# Patient Record
Sex: MALE | Race: ASIAN | NOT HISPANIC OR LATINO | Employment: OTHER | URBAN - METROPOLITAN AREA
[De-identification: names, ages, dates, MRNs, and addresses within clinical notes are randomized per-mention and may not be internally consistent; named-entity substitution may affect disease eponyms.]

---

## 2017-01-24 ENCOUNTER — LAB VISIT (OUTPATIENT)
Dept: LAB | Facility: HOSPITAL | Age: 72
End: 2017-01-24
Payer: MEDICARE

## 2017-01-24 DIAGNOSIS — R97.20 ELEVATED PSA: ICD-10-CM

## 2017-01-24 LAB — COMPLEXED PSA SERPL-MCNC: 2.2 NG/ML

## 2017-01-24 PROCEDURE — 84153 ASSAY OF PSA TOTAL: CPT

## 2017-01-24 PROCEDURE — 36415 COLL VENOUS BLD VENIPUNCTURE: CPT

## 2017-01-31 ENCOUNTER — OFFICE VISIT (OUTPATIENT)
Dept: UROLOGY | Facility: CLINIC | Age: 72
End: 2017-01-31
Payer: MEDICARE

## 2017-01-31 VITALS
DIASTOLIC BLOOD PRESSURE: 68 MMHG | WEIGHT: 169.06 LBS | HEART RATE: 59 BPM | BODY MASS INDEX: 26.53 KG/M2 | SYSTOLIC BLOOD PRESSURE: 117 MMHG | HEIGHT: 67 IN

## 2017-01-31 DIAGNOSIS — N52.9 ED (ERECTILE DYSFUNCTION) OF ORGANIC ORIGIN: ICD-10-CM

## 2017-01-31 DIAGNOSIS — N40.1 BPH (BENIGN PROSTATIC HYPERTROPHY) WITH URINARY OBSTRUCTION: Primary | ICD-10-CM

## 2017-01-31 DIAGNOSIS — N13.8 BPH (BENIGN PROSTATIC HYPERTROPHY) WITH URINARY OBSTRUCTION: Primary | ICD-10-CM

## 2017-01-31 PROCEDURE — 99214 OFFICE O/P EST MOD 30 MIN: CPT | Mod: S$PBB,,, | Performed by: UROLOGY

## 2017-01-31 PROCEDURE — 99999 PR PBB SHADOW E&M-EST. PATIENT-LVL III: CPT | Mod: PBBFAC,,, | Performed by: UROLOGY

## 2017-01-31 PROCEDURE — 99213 OFFICE O/P EST LOW 20 MIN: CPT | Mod: PBBFAC | Performed by: UROLOGY

## 2017-01-31 NOTE — PROGRESS NOTES
CC: ED    Pee Alba is a 71 y.o. man who is here for the evaluation of erectile dysfunction (follow up appiontment pt voice no issue at this vist he feel he doing well )  a [place] pt seen by me before for prostatitis before with elevated PSA.   He is doing well.   Stopped rapaflo because he felt his urination is back to normal.     c/o ED  Tried pills and found that he did not respond to the pills.  Went to a commercial ED clinic and tried PEP injection.  Disappointed to their service and its cost.    Positive smoking more than 50 years pack.    Denies flank pain, dysuira, hematuria .      Past Medical History   Diagnosis Date    Aortic aneurysm     Arrhythmia     Hypertension      Past Surgical History   Procedure Laterality Date    Abdominal surgery       Social History   Substance Use Topics    Smoking status: Current Every Day Smoker     Packs/day: 0.50     Types: Cigarettes    Smokeless tobacco: Never Used    Alcohol use No     History reviewed. No pertinent family history.  Allergy:  Review of patient's allergies indicates:   Allergen Reactions    Iodinated contrast media - iv dye Anaphylaxis     Outpatient Encounter Prescriptions as of 1/31/2017   Medication Sig Dispense Refill    amlodipine-valsartan (EXFORGE)  mg per tablet Take 1 tablet by mouth once daily.      carvedilol (COREG) 12.5 MG tablet Take 12.5 mg by mouth 2 (two) times daily with meals.      omeprazole (PRILOSEC) 20 MG capsule Take 20 mg by mouth once daily.      torsemide (DEMADEX) 5 MG Tab Take 20 mg by mouth once daily.      [DISCONTINUED] papaverine 30 mg/mL injection Add: Phentolamine 10 mg  Add: PGE1 100 mcg    Sig:  Inject 15 units (0.15 mls) as directed 5 mL 11     Facility-Administered Encounter Medications as of 1/31/2017   Medication Dose Route Frequency Provider Last Rate Last Dose    [DISCONTINUED] ciprofloxacin tablet 500 mg  500 mg Oral 1 time in Clinic/HOD James Aponte MD         Review of Systems   General  ROS: GENERAL:  No weight gain or loss  SKIN:  No rashes or lacerations  HEAD:  No headaches  EYES:  No exophthalmos, jaundice or blindness  EARS:  No dizziness, tinnitus or hearing loss  NOSE:  No changes in smell  MOUTH & THROAT:  No dyskinetic movements or obvious goiter  CHEST:  No shortness of breath, hyperventilation or cough  CARDIOVASCULAR:  No tachycardia or chest pain  ABDOMEN:  No nausea, vomiting, pain, constipation or diarrhea  URINARY:  No frequency, dysuria or sexual dysfunction  ENDOCRINE:  No polydipsia, polyuria  MUSCULOSKELETAL:  No pain or stiffness of the joints  NEUROLOGIC:  No weakness, sensory changes, seizures, confusion, memory loss, tremor or other abnormal movements  Physical Exam     Vitals:    01/31/17 0940   BP: 117/68   Pulse: (!) 59     General Appearance:  Alert, cooperative, no distress, appears stated age   Head:  Normocephalic, without obvious abnormality, atraumatic   Eyes:  PERRL, conjunctiva/corneas clear, EOM's intact, fundi benign, both eyes   Ears:  Normal TM's and external ear canals, both ears   Nose: Nares normal, septum midline, mucosa normal, no drainage or sinus tenderness   Throat: Lips, mucosa, and tongue normal; teeth and gums normal   Neck: Supple, symmetrical, trachea midline, no adenopathy, thyroid: not enlarged, symmetric, no tenderness/mass/nodules, no carotid bruit or JVD   Back:   Symmetric, no curvature, ROM normal, no CVA tenderness   Lungs:   Clear to auscultation bilaterally, respirations unlabored   Chest Wall:  No tenderness or deformity   Heart:  Regular rate and rhythm, S1, S2 normal, no murmur, rub or gallop   Abdomen:   Soft, non-tender, bowel sounds active all four quadrants,  no masses, no organomegaly of liver and spleen  No hernia noted   Genitalia:  Scrotum: no rash or lesion  Normal symmetric epididymis without masses  Normal vas palpated  Normal size, symmetric testicles with no masses   Normal urethral meatus with no discharge  Normal  circumcised penis with no lesion   Rectal:  Normal perineum and anus upon inspection.  Normal tone, no masses or tenderness;   Extremities: Extremities normal, atraumatic, no cyanosis or edema   Pulses: 2+ and symmetric   Skin: Skin color, texture, turgor normal, no rashes or lesions   Lymph nodes: Cervical, supraclavicular, and axillary nodes normal   Neurologic: Normal     Prostate 30 grams smooth with no nodule or tenderness.    LABS:  Lab Results   Component Value Date    PSADIAG 2.2 01/24/2017    PSADIAG 3.3 01/12/2016    PSADIAG 3.1 11/18/2014    PSADIAG 29.9 (H) 03/13/2014     Results for orders placed or performed in visit on 01/24/17   Prostate Specific Antigen, Diagnostic   Result Value Ref Range    PSA DIAGNOSTIC 2.2 0.00 - 4.00 ng/mL   Results for orders placed or performed in visit on 01/12/16   Prostate Specific Antigen, Diagnostic   Result Value Ref Range    PSA DIAGNOSTIC 3.3 0.00 - 4.00 ng/mL   Results for orders placed or performed in visit on 11/18/14   Prostate Specific Antigen, Diagnostic   Result Value Ref Range    PSA DIAGNOSTIC 3.1 0.00 - 4.00 ng/mL     Lab Results   Component Value Date    CREATININE 1.2 03/14/2014    CREATININE 1.2 03/13/2014    CREATININE 0.9 12/06/2012     No results found for this or any previous visit.  Urine Culture, Routine   Date Value Ref Range Status   03/13/2014 No significant growth  Final     Assessment and Plan:  Pee was seen today for erectile dysfunction.    Diagnoses and all orders for this visit:    BPH (benign prostatic hypertrophy) with urinary obstruction    ED (erectile dysfunction) of organic origin  he failed to respond to pills and PEP injection.  Suspect severe ED due to arterial insufficiency.  Recommend to stop smoking.  IPP discussed in detail.  Nature and risks of operation explained.  A brochure given.  He will review it and will let me know if he decided to pursue IPP.  I spent 25 minutes with the patient of which more than half was spent in  direct consultation with the patient in regards to our treatment and plan.      Follow-up:  Return in about 1 year (around 1/31/2018).

## 2017-01-31 NOTE — MR AVS SNAPSHOT
[facility] - Urology 4th Floor  [address]  [place] [address]  Phone: [phone]                  Pee Alba   2017 9:20 AM   Office Visit    Description:  Male : 1945   Provider:  James Aponte MD   Department:  [facility] - Urology 4th Floor           Reason for Visit     erectile dysfunction           Diagnoses this Visit        Comments    BPH (benign prostatic hypertrophy) with urinary obstruction    -  Primary     ED (erectile dysfunction) of organic origin                To Do List           Goals (5 Years of Data)     None      Follow-Up and Disposition     Return in about 1 year (around 2018).      [facility]     [facility] -  Assistance  Registered nurses in the [facility] provide clinical advisement, health education, appointment booking, and other advisory services.  Call for this free service at [phone].             Medications           Message regarding Medications     Verify the changes and/or additions to your medication regime listed below are the same as discussed with your clinician today.  If any of these changes or additions are incorrect, please notify your healthcare provider.        STOP taking these medications     papaverine 30 mg/mL injection Add: Phentolamine 10 mg  Add: PGE1 100 mcg    Sig:  Inject 15 units (0.15 mls) as directed    ciprofloxacin tablet 500 mg            Verify that the below list of medications is an accurate representation of the medications you are currently taking.  If none reported, the list may be blank. If incorrect, please contact your healthcare provider. Carry this list with you in case of emergency.           Current Medications     amlodipine-valsartan (EXFORGE)  mg per tablet Take 1 tablet by mouth once daily.    carvedilol (COREG) 12.5 MG tablet Take 12.5 mg by mouth 2 (two) times daily with meals.    omeprazole (PRILOSEC) 20 MG capsule Take 20 mg by mouth once daily.  "   torsemide (DEMADEX) 5 MG Tab Take 20 mg by mouth once daily.           Clinical Reference Information           Vital Signs - Last Recorded  Most recent update: 1/31/2017  9:41 AM by Keyona Bello MA    BP Pulse Ht Wt BMI    117/68 (!) 59 5' 7" (1.702 m) 76.7 kg (169 lb 1.5 oz) 26.48 kg/m2      Blood Pressure          Most Recent Value    BP  117/68      Allergies as of 1/31/2017     Iodinated Contrast Media - Iv Dye      Immunizations Administered on Date of Encounter - 1/31/2017     None      Smoking Cessation     If you would like to quit smoking:   You may be eligible for free services if you are a [place] resident and started smoking cigarettes before September 1, 1988.  Call the [employer] ([employer]) toll free at [phone] or [phone].   Call 2-837-QUIT-NOW if you do not meet the above criteria.            "

## 2019-06-18 DIAGNOSIS — N52.9 ED (ERECTILE DYSFUNCTION) OF ORGANIC ORIGIN: ICD-10-CM

## 2019-06-18 DIAGNOSIS — N13.8 BPH WITH URINARY OBSTRUCTION: Primary | ICD-10-CM

## 2019-06-18 DIAGNOSIS — Z86.39 HISTORY OF HIGH CHOLESTEROL: ICD-10-CM

## 2019-06-18 DIAGNOSIS — N40.1 BPH WITH URINARY OBSTRUCTION: Primary | ICD-10-CM

## 2019-06-18 NOTE — PROGRESS NOTES
BPH with urinary obstruction  -     Prostate Specific Antigen, Diagnostic; Future; Expected date: 06/18/2019    ED (erectile dysfunction) of organic origin  -     CBC auto differential; Future; Expected date: 06/18/2019  -     Comprehensive metabolic panel; Future; Expected date: 06/18/2019  -     Testosterone; Future; Expected date: 06/18/2019    History of high cholesterol    please have him do the above tests as fasting AM blood tests prior to his visit on 7/16/19.  Thank you.

## 2019-06-25 ENCOUNTER — LAB VISIT (OUTPATIENT)
Dept: LAB | Facility: HOSPITAL | Age: 74
End: 2019-06-25
Attending: UROLOGY
Payer: MEDICARE

## 2019-06-25 DIAGNOSIS — N52.9 ED (ERECTILE DYSFUNCTION) OF ORGANIC ORIGIN: ICD-10-CM

## 2019-06-25 DIAGNOSIS — N40.1 BPH WITH URINARY OBSTRUCTION: ICD-10-CM

## 2019-06-25 DIAGNOSIS — N13.8 BPH WITH URINARY OBSTRUCTION: ICD-10-CM

## 2019-06-25 LAB
ALBUMIN SERPL BCP-MCNC: 4 G/DL (ref 3.5–5.2)
ALP SERPL-CCNC: 45 U/L (ref 55–135)
ALT SERPL W/O P-5'-P-CCNC: 27 U/L (ref 10–44)
ANION GAP SERPL CALC-SCNC: 8 MMOL/L (ref 8–16)
AST SERPL-CCNC: 18 U/L (ref 10–40)
BASOPHILS # BLD AUTO: 0.03 K/UL (ref 0–0.2)
BASOPHILS NFR BLD: 0.4 % (ref 0–1.9)
BILIRUB SERPL-MCNC: 0.8 MG/DL (ref 0.1–1)
BUN SERPL-MCNC: 21 MG/DL (ref 8–23)
CALCIUM SERPL-MCNC: 10.3 MG/DL (ref 8.7–10.5)
CHLORIDE SERPL-SCNC: 105 MMOL/L (ref 95–110)
CO2 SERPL-SCNC: 28 MMOL/L (ref 23–29)
COMPLEXED PSA SERPL-MCNC: 2.5 NG/ML (ref 0–4)
CREAT SERPL-MCNC: 0.9 MG/DL (ref 0.5–1.4)
DIFFERENTIAL METHOD: ABNORMAL
EOSINOPHIL # BLD AUTO: 0.3 K/UL (ref 0–0.5)
EOSINOPHIL NFR BLD: 3.5 % (ref 0–8)
ERYTHROCYTE [DISTWIDTH] IN BLOOD BY AUTOMATED COUNT: 13.6 % (ref 11.5–14.5)
EST. GFR  (AFRICAN AMERICAN): >60 ML/MIN/1.73 M^2
EST. GFR  (NON AFRICAN AMERICAN): >60 ML/MIN/1.73 M^2
GLUCOSE SERPL-MCNC: 107 MG/DL (ref 70–110)
HCT VFR BLD AUTO: 46.5 % (ref 40–54)
HGB BLD-MCNC: 15.2 G/DL (ref 14–18)
IMM GRANULOCYTES # BLD AUTO: 0.04 K/UL (ref 0–0.04)
IMM GRANULOCYTES NFR BLD AUTO: 0.6 % (ref 0–0.5)
LYMPHOCYTES # BLD AUTO: 1.8 K/UL (ref 1–4.8)
LYMPHOCYTES NFR BLD: 25 % (ref 18–48)
MCH RBC QN AUTO: 30.5 PG (ref 27–31)
MCHC RBC AUTO-ENTMCNC: 32.7 G/DL (ref 32–36)
MCV RBC AUTO: 93 FL (ref 82–98)
MONOCYTES # BLD AUTO: 0.4 K/UL (ref 0.3–1)
MONOCYTES NFR BLD: 6.2 % (ref 4–15)
NEUTROPHILS # BLD AUTO: 4.6 K/UL (ref 1.8–7.7)
NEUTROPHILS NFR BLD: 64.3 % (ref 38–73)
NRBC BLD-RTO: 0 /100 WBC
PLATELET # BLD AUTO: 190 K/UL (ref 150–350)
PMV BLD AUTO: 9.3 FL (ref 9.2–12.9)
POTASSIUM SERPL-SCNC: 4.2 MMOL/L (ref 3.5–5.1)
PROT SERPL-MCNC: 7.1 G/DL (ref 6–8.4)
RBC # BLD AUTO: 4.99 M/UL (ref 4.6–6.2)
SODIUM SERPL-SCNC: 141 MMOL/L (ref 136–145)
TESTOST SERPL-MCNC: 445 NG/DL (ref 304–1227)
WBC # BLD AUTO: 7.08 K/UL (ref 3.9–12.7)

## 2019-06-25 PROCEDURE — 80053 COMPREHEN METABOLIC PANEL: CPT

## 2019-06-25 PROCEDURE — 84153 ASSAY OF PSA TOTAL: CPT

## 2019-06-25 PROCEDURE — 85025 COMPLETE CBC W/AUTO DIFF WBC: CPT

## 2019-06-25 PROCEDURE — 84403 ASSAY OF TOTAL TESTOSTERONE: CPT

## 2019-06-25 PROCEDURE — 36415 COLL VENOUS BLD VENIPUNCTURE: CPT

## 2019-07-16 ENCOUNTER — OFFICE VISIT (OUTPATIENT)
Dept: UROLOGY | Facility: CLINIC | Age: 74
End: 2019-07-16
Payer: MEDICARE

## 2019-07-16 VITALS
HEART RATE: 60 BPM | BODY MASS INDEX: 26.9 KG/M2 | HEIGHT: 68 IN | DIASTOLIC BLOOD PRESSURE: 73 MMHG | SYSTOLIC BLOOD PRESSURE: 122 MMHG | WEIGHT: 177.5 LBS

## 2019-07-16 DIAGNOSIS — N13.8 BENIGN PROSTATIC HYPERPLASIA WITH URINARY OBSTRUCTION: Primary | ICD-10-CM

## 2019-07-16 DIAGNOSIS — N52.9 ED (ERECTILE DYSFUNCTION) OF ORGANIC ORIGIN: ICD-10-CM

## 2019-07-16 DIAGNOSIS — N40.1 BENIGN PROSTATIC HYPERPLASIA WITH URINARY OBSTRUCTION: Primary | ICD-10-CM

## 2019-07-16 PROCEDURE — 99999 PR PBB SHADOW E&M-EST. PATIENT-LVL III: ICD-10-PCS | Mod: PBBFAC,,, | Performed by: UROLOGY

## 2019-07-16 PROCEDURE — 99999 PR PBB SHADOW E&M-EST. PATIENT-LVL III: CPT | Mod: PBBFAC,,, | Performed by: UROLOGY

## 2019-07-16 PROCEDURE — 99215 PR OFFICE/OUTPT VISIT, EST, LEVL V, 40-54 MIN: ICD-10-PCS | Mod: S$PBB,,, | Performed by: UROLOGY

## 2019-07-16 PROCEDURE — 99213 OFFICE O/P EST LOW 20 MIN: CPT | Mod: PBBFAC | Performed by: UROLOGY

## 2019-07-16 PROCEDURE — 99215 OFFICE O/P EST HI 40 MIN: CPT | Mod: S$PBB,,, | Performed by: UROLOGY

## 2019-07-16 RX ORDER — TADALAFIL 5 MG/1
5 TABLET ORAL DAILY PRN
Qty: 30 TABLET | Refills: 11 | Status: SHIPPED | OUTPATIENT
Start: 2019-07-16 | End: 2020-10-27 | Stop reason: SDUPTHER

## 2019-07-16 RX ORDER — SILDENAFIL 100 MG/1
100 TABLET, FILM COATED ORAL DAILY PRN
Qty: 10 TABLET | Refills: 12 | Status: SHIPPED | OUTPATIENT
Start: 2019-07-16 | End: 2019-08-15

## 2019-07-16 RX ORDER — TAMSULOSIN HYDROCHLORIDE 0.4 MG/1
0.4 CAPSULE ORAL NIGHTLY
Qty: 30 CAPSULE | Refills: 11 | Status: SHIPPED | OUTPATIENT
Start: 2019-07-16 | End: 2020-10-27 | Stop reason: SDUPTHER

## 2019-07-16 NOTE — PROGRESS NOTES
CC: weak urine, nocturia 3 x, ED    Pee Alba is a 74 y.o. man who is here for the evaluation of ED and BPH with obstruciton  Hx of ED and Benign Prostatic Hypertrophy with prostatitis  a Chinese pt seen by me before for prostatitis before with elevated PSA.   He is doing well.   Stopped rapaflo because he felt his urination is back to normal.     c/o ED  Tried pills and found that he did not respond to the pills.  Went to a commercial ED clinic and tried PEP injection.  Disappointed to their service and its cost.  So he tried PEP injection with Karo Roque.  However, he reports that the outcome was as good as before.  So he no longer uses a pep injection.    Positive smoking    Past Medical History:   Diagnosis Date    Aortic aneurysm     Arrhythmia     Hypertension      Past Surgical History:   Procedure Laterality Date    ABDOMINAL SURGERY       Social History     Tobacco Use    Smoking status: Current Every Day Smoker     Packs/day: 0.50     Types: Cigarettes    Smokeless tobacco: Never Used   Substance Use Topics    Alcohol use: No    Drug use: Not on file     No family history on file.  Allergy:  Review of patient's allergies indicates:   Allergen Reactions    Iodinated contrast- oral and iv dye Anaphylaxis     Outpatient Encounter Medications as of 7/16/2019   Medication Sig Dispense Refill    amlodipine-valsartan (EXFORGE)  mg per tablet Take 1 tablet by mouth once daily.      carvedilol (COREG) 12.5 MG tablet Take 12.5 mg by mouth 2 (two) times daily with meals.      omeprazole (PRILOSEC) 20 MG capsule Take 20 mg by mouth once daily.      torsemide (DEMADEX) 5 MG Tab Take 20 mg by mouth once daily.      sildenafil (VIAGRA) 100 MG tablet Take 1 tablet (100 mg total) by mouth daily as needed. 10 tablet 12    tadalafil (CIALIS) 5 MG tablet Take 1 tablet (5 mg total) by mouth daily as needed for Erectile Dysfunction. 30 tablet 11    tamsulosin (FLOMAX) 0.4 mg Cap Take 1 capsule (0.4 mg  total) by mouth every evening. 30 capsule 11     No facility-administered encounter medications on file as of 7/16/2019.      Review of Systems   ROS  Physical Exam     Vitals:    07/16/19 1057   BP: 122/73   Pulse: 60     Physical Exam   Constitutional: He is oriented to person, place, and time. He appears well-developed and well-nourished. No distress.   HENT:   Head: Normocephalic and atraumatic.   Right Ear: External ear normal.   Left Ear: External ear normal.   Nose: Nose normal.   Mouth/Throat: Oropharynx is clear and moist.   Eyes: Conjunctivae are normal. Pupils are equal, round, and reactive to light.   Neck: Normal range of motion. Neck supple. No JVD present. No tracheal deviation present. No thyromegaly present.   Cardiovascular: Normal rate, regular rhythm, normal heart sounds and intact distal pulses.  Exam reveals no gallop and no friction rub.    No murmur heard.  Pulmonary/Chest: Effort normal and breath sounds normal. No respiratory distress. He has no wheezes. He exhibits no tenderness.   Abdominal: Soft. Bowel sounds are normal. He exhibits no distension and no mass. There is no tenderness. There is no rebound and no guarding.   Genitourinary: Rectum normal and penis normal. No penile tenderness.   Genitourinary Comments: Prostate 30 grams with negative nodule or negative tenderness     Musculoskeletal: Normal range of motion. He exhibits no edema, tenderness or deformity.   Lymphadenopathy:     He has no cervical adenopathy.   Neurological: He is alert and oriented to person, place, and time.   Skin: Skin is warm and dry. He is not diaphoretic.     Psychiatric: He has a normal mood and affect. His behavior is normal. Thought content normal.     Genitalia:  Scrotum: no rash or lesion  Normal symmetric epididymis without masses  Normal vas palpated  Normal size, symmetric testicles with no masses   Normal urethral meatus with no discharge  Normal circumcised penis with no lesion   Rectal:  Normal  perineum and anus upon inspection.  Normal tone, no masses or tenderness;     LABS:  Lab Results   Component Value Date    PSADIAG 2.5 06/25/2019    PSADIAG 2.2 01/24/2017    PSADIAG 3.3 01/12/2016    PSADIAG 3.1 11/18/2014    PSADIAG 29.9 (H) 03/13/2014     Results for orders placed or performed in visit on 06/25/19   Prostate Specific Antigen, Diagnostic   Result Value Ref Range    PSA DIAGNOSTIC 2.5 0.00 - 4.00 ng/mL   Results for orders placed or performed in visit on 01/24/17   Prostate Specific Antigen, Diagnostic   Result Value Ref Range    PSA DIAGNOSTIC 2.2 0.00 - 4.00 ng/mL   Results for orders placed or performed in visit on 01/12/16   Prostate Specific Antigen, Diagnostic   Result Value Ref Range    PSA DIAGNOSTIC 3.3 0.00 - 4.00 ng/mL     Lab Results   Component Value Date    CREATININE 0.9 06/25/2019    CREATININE 1.2 03/14/2014    CREATININE 1.2 03/13/2014     Results for orders placed or performed in visit on 06/25/19   Testosterone   Result Value Ref Range    Testosterone, Total 445 304 - 1227 ng/dL     Urine Culture, Routine   Date Value Ref Range Status   03/13/2014 No significant growth  Final       Assessment and Plan:  Pee was seen today for follow-up.    Diagnoses and all orders for this visit:    Benign prostatic hyperplasia with urinary obstruction  -     tadalafil (CIALIS) 5 MG tablet; Take 1 tablet (5 mg total) by mouth daily as needed for Erectile Dysfunction.  -     tamsulosin (FLOMAX) 0.4 mg Cap; Take 1 capsule (0.4 mg total) by mouth every evening.    ED (erectile dysfunction) of organic origin  -     tadalafil (CIALIS) 5 MG tablet; Take 1 tablet (5 mg total) by mouth daily as needed for Erectile Dysfunction.  -     sildenafil (VIAGRA) 100 MG tablet; Take 1 tablet (100 mg total) by mouth daily as needed.      Treatment of enlarged prostate including medical treatment, minimally invasive therapy   (TUMT or UroLift), and TURP explained.  Nature and risks of each therapy as well as their  benefits discussed respectively.  He will try medical therapy first, then decide whether he will continue medical treatment or other alternative therapy as we discussed.  Rezum therapy Brochures given.    He will start Flomax.    For his ED, I explained how erection occurs, common causes of ED, treatment options including oral mediations, vacuum erection devices(EPIFANIO), injection therapy, MUSE, and penile prostheses.     He would like to try pills again.  So he will start daily cialis.  He can as viagra as needed.  Detailed instructions given.  A brochure of IPP given.    I spent 40 minutes with the patient of which more than half was spent in direct consultation with the patient in regards to our treatment and plan.    Follow-up:  Follow up in about 3 months (around 10/16/2019).

## 2020-10-27 ENCOUNTER — LAB VISIT (OUTPATIENT)
Dept: LAB | Facility: HOSPITAL | Age: 75
End: 2020-10-27
Attending: UROLOGY
Payer: MEDICARE

## 2020-10-27 ENCOUNTER — OFFICE VISIT (OUTPATIENT)
Dept: UROLOGY | Facility: CLINIC | Age: 75
End: 2020-10-27
Payer: MEDICARE

## 2020-10-27 VITALS
BODY MASS INDEX: 27.86 KG/M2 | SYSTOLIC BLOOD PRESSURE: 109 MMHG | WEIGHT: 177.5 LBS | HEIGHT: 67 IN | DIASTOLIC BLOOD PRESSURE: 66 MMHG | HEART RATE: 80 BPM

## 2020-10-27 DIAGNOSIS — N40.1 BENIGN PROSTATIC HYPERPLASIA WITH URINARY OBSTRUCTION: ICD-10-CM

## 2020-10-27 DIAGNOSIS — N13.8 BENIGN PROSTATIC HYPERPLASIA WITH URINARY OBSTRUCTION: Primary | ICD-10-CM

## 2020-10-27 DIAGNOSIS — N40.1 BENIGN PROSTATIC HYPERPLASIA WITH URINARY OBSTRUCTION: Primary | ICD-10-CM

## 2020-10-27 DIAGNOSIS — N13.8 BENIGN PROSTATIC HYPERPLASIA WITH URINARY OBSTRUCTION: ICD-10-CM

## 2020-10-27 DIAGNOSIS — N52.9 ED (ERECTILE DYSFUNCTION) OF ORGANIC ORIGIN: ICD-10-CM

## 2020-10-27 LAB — COMPLEXED PSA SERPL-MCNC: 2.2 NG/ML (ref 0–4)

## 2020-10-27 PROCEDURE — 99215 PR OFFICE/OUTPT VISIT, EST, LEVL V, 40-54 MIN: ICD-10-PCS | Mod: S$PBB,,, | Performed by: UROLOGY

## 2020-10-27 PROCEDURE — 36415 COLL VENOUS BLD VENIPUNCTURE: CPT

## 2020-10-27 PROCEDURE — 99215 OFFICE O/P EST HI 40 MIN: CPT | Mod: S$PBB,,, | Performed by: UROLOGY

## 2020-10-27 PROCEDURE — 99999 PR PBB SHADOW E&M-EST. PATIENT-LVL III: ICD-10-PCS | Mod: PBBFAC,,, | Performed by: UROLOGY

## 2020-10-27 PROCEDURE — 99213 OFFICE O/P EST LOW 20 MIN: CPT | Mod: PBBFAC | Performed by: UROLOGY

## 2020-10-27 PROCEDURE — 99999 PR PBB SHADOW E&M-EST. PATIENT-LVL III: CPT | Mod: PBBFAC,,, | Performed by: UROLOGY

## 2020-10-27 PROCEDURE — 84153 ASSAY OF PSA TOTAL: CPT

## 2020-10-27 RX ORDER — SILDENAFIL 100 MG/1
100 TABLET, FILM COATED ORAL DAILY PRN
Qty: 30 TABLET | Refills: 3 | Status: SHIPPED | OUTPATIENT
Start: 2020-10-27 | End: 2021-02-23

## 2020-10-27 RX ORDER — TADALAFIL 5 MG/1
5 TABLET ORAL DAILY PRN
Qty: 30 TABLET | Refills: 11 | Status: SHIPPED | OUTPATIENT
Start: 2020-10-27 | End: 2021-10-27

## 2020-10-27 RX ORDER — DOXYCYCLINE HYCLATE 100 MG
100 TABLET ORAL ONCE
Status: CANCELLED | OUTPATIENT
Start: 2020-10-27 | End: 2020-10-27

## 2020-10-27 RX ORDER — SILDENAFIL 100 MG/1
100 TABLET, FILM COATED ORAL DAILY PRN
Qty: 10 TABLET | Refills: 12 | Status: SHIPPED | OUTPATIENT
Start: 2020-10-27 | End: 2020-11-26

## 2020-10-27 RX ORDER — TAMSULOSIN HYDROCHLORIDE 0.4 MG/1
0.4 CAPSULE ORAL NIGHTLY
Qty: 30 CAPSULE | Refills: 11 | Status: SHIPPED | OUTPATIENT
Start: 2020-10-27

## 2020-10-27 RX ORDER — LIDOCAINE HYDROCHLORIDE 20 MG/ML
JELLY TOPICAL ONCE
Status: CANCELLED | OUTPATIENT
Start: 2020-10-27 | End: 2020-10-27

## 2020-10-27 NOTE — PROGRESS NOTES
CC: weak urine, nocturia 3 x, ED    Pee Alba is a 75 y.o. man who is here for the evaluation of ED and BPH with obstruciton  Hx of ED and Benign Prostatic Hypertrophy with prostatitis  a [place] pt seen by me before for prostatitis before with elevated PSA.   He is doing well.   Stopped rapaflo because he felt his urination is back to normal.     c/o ED  Tried pills and found that he did not respond to the pills.  Went to a commercial ED clinic and tried PEP injection.  Disappointed to their service and its cost.  So he tried PEP injection with Karo Roque.  However, he reports that the outcome was as good as before.  So he no longer uses a pep injection.    Positive smoking    Past Medical History:   Diagnosis Date    Aortic aneurysm     Arrhythmia     Hypertension      Past Surgical History:   Procedure Laterality Date    ABDOMINAL SURGERY       Social History     Tobacco Use    Smoking status: Current Every Day Smoker     Packs/day: 0.50     Types: Cigarettes    Smokeless tobacco: Never Used   Substance Use Topics    Alcohol use: No    Drug use: Not on file     History reviewed. No pertinent family history.  Allergy:  Review of patient's allergies indicates:   Allergen Reactions    Iodinated contrast media Anaphylaxis     Outpatient Encounter Medications as of 10/27/2020   Medication Sig Dispense Refill    amlodipine-valsartan (EXFORGE)  mg per tablet Take 1 tablet by mouth once daily.      carvedilol (COREG) 12.5 MG tablet Take 12.5 mg by mouth 2 (two) times daily with meals.      omeprazole (PRILOSEC) 20 MG capsule Take 20 mg by mouth once daily.      sildenafil (VIAGRA) 100 MG tablet Take 1 tablet (100 mg total) by mouth daily as needed. 10 tablet 12    sildenafiL (VIAGRA) 100 MG tablet Take 1 tablet (100 mg total) by mouth daily as needed. 10 tablet 12    tadalafiL (CIALIS) 5 MG tablet Take 1 tablet (5 mg total) by mouth daily as needed for Erectile Dysfunction. 30 tablet 11    tamsulosin  (FLOMAX) 0.4 mg Cap Take 1 capsule (0.4 mg total) by mouth every evening. 30 capsule 11    torsemide (DEMADEX) 5 MG Tab Take 20 mg by mouth once daily.      [DISCONTINUED] tadalafil (CIALIS) 5 MG tablet Take 1 tablet (5 mg total) by mouth daily as needed for Erectile Dysfunction. 30 tablet 11    [DISCONTINUED] tamsulosin (FLOMAX) 0.4 mg Cap Take 1 capsule (0.4 mg total) by mouth every evening. 30 capsule 11     No facility-administered encounter medications on file as of 10/27/2020.      Review of Systems   ROS  Physical Exam     Vitals:    10/27/20 1555   BP: 109/66   Pulse: 80     Physical Exam  Constitutional:       General: He is not in acute distress.     Appearance: He is well-developed. He is not diaphoretic.   HENT:      Head: Normocephalic and atraumatic.      Right Ear: External ear normal.      Left Ear: External ear normal.      Nose: Nose normal.   Eyes:      Conjunctiva/sclera: Conjunctivae normal.      Pupils: Pupils are equal, round, and reactive to light.   Neck:      Musculoskeletal: Normal range of motion and neck supple.      Thyroid: No thyromegaly.      Vascular: No JVD.      Trachea: No tracheal deviation.   Cardiovascular:      Rate and Rhythm: Normal rate and regular rhythm.      Heart sounds: Normal heart sounds. No murmur. No friction rub. No gallop.    Pulmonary:      Effort: Pulmonary effort is normal. No respiratory distress.      Breath sounds: Normal breath sounds. No wheezing.   Chest:      Chest wall: No tenderness.   Abdominal:      General: Bowel sounds are normal. There is no distension.      Palpations: Abdomen is soft. There is no mass.      Tenderness: There is no abdominal tenderness. There is no guarding or rebound.   Genitourinary:     Penis: Normal. No tenderness.       Rectum: Normal.      Comments: Prostate 30 grams with negative nodule or negative tenderness    Musculoskeletal: Normal range of motion.         General: No tenderness or deformity.   Lymphadenopathy:       Cervical: No cervical adenopathy.   Skin:     General: Skin is warm and dry.   Neurological:      Mental Status: He is alert and oriented to person, place, and time.   Psychiatric:         Behavior: Behavior normal.         Thought Content: Thought content normal.       Genitalia:  Scrotum: no rash or lesion  Normal symmetric epididymis without masses  Normal vas palpated  Normal size, symmetric testicles with no masses   Normal urethral meatus with no discharge  Normal circumcised penis with no lesion   Rectal:  Normal perineum and anus upon inspection.  Normal tone, no masses or tenderness;     LABS:  Lab Results   Component Value Date    PSADIAG 2.5 06/25/2019    PSADIAG 2.2 01/24/2017    PSADIAG 3.3 01/12/2016    PSADIAG 3.1 11/18/2014    PSADIAG 29.9 (H) 03/13/2014     Results for orders placed or performed in visit on 06/25/19   Prostate Specific Antigen, Diagnostic   Result Value Ref Range    PSA Diagnostic 2.5 0.00 - 4.00 ng/mL   Results for orders placed or performed in visit on 01/24/17   Prostate Specific Antigen, Diagnostic   Result Value Ref Range    PSA Diagnostic 2.2 0.00 - 4.00 ng/mL   Results for orders placed or performed in visit on 01/12/16   Prostate Specific Antigen, Diagnostic   Result Value Ref Range    PSA Diagnostic 3.3 0.00 - 4.00 ng/mL     Lab Results   Component Value Date    CREATININE 0.9 06/25/2019    CREATININE 1.2 03/14/2014    CREATININE 1.2 03/13/2014     Results for orders placed or performed in visit on 06/25/19   Testosterone   Result Value Ref Range    Testosterone, Total 445 304 - 1227 ng/dL     Urine Culture, Routine   Date Value Ref Range Status   03/13/2014 No significant growth  Final       Assessment and Plan:  Pee was seen today for follow-up.    Diagnoses and all orders for this visit:    Benign prostatic hyperplasia with urinary obstruction  -     tamsulosin (FLOMAX) 0.4 mg Cap; Take 1 capsule (0.4 mg total) by mouth every evening.  -     tadalafiL (CIALIS) 5 MG  tablet; Take 1 tablet (5 mg total) by mouth daily as needed for Erectile Dysfunction.  -     Prostate Specific Antigen, Diagnostic; Future  -     Cystoscopy; Future    ED (erectile dysfunction) of organic origin  -     sildenafiL (VIAGRA) 100 MG tablet; Take 1 tablet (100 mg total) by mouth daily as needed.    Other orders  -     lidocaine HCl 2% urojet  -     doxycycline tablet 100 mg      Treatment of enlarged prostate including medical treatment, minimally invasive therapy   (TUMT or UroLift), and TURP explained.  Nature and risks of each therapy as well as their benefits discussed respectively.  He will try medical therapy first, then decide whether he will continue medical treatment or other alternative therapy as we discussed.  Rezum therapy Brochures given.  He watched Rezum Therapy video.    He will start Flomax and daily cialis.  Will see him in 1 month for cysto.  Will determine whether or not he should undergo Rezem therapy or continue medical therapy.  PSA today.    For his ED, I explained how erection occurs, common causes of ED, treatment options including oral mediations, vacuum erection devices(EPIFANIO), injection therapy, MUSE, and penile prostheses.     He would like to try pills again.  So he will start daily cialis.  He can as viagra as needed.  Detailed instructions given.    I spent 40 minutes with the patient of which more than half was spent in direct consultation with the patient in regards to our treatment and plan.    Follow-up:  Follow up cysto.

## 2020-12-03 ENCOUNTER — TELEPHONE (OUTPATIENT)
Dept: UROLOGY | Facility: CLINIC | Age: 75
End: 2020-12-03

## 2020-12-03 ENCOUNTER — PROCEDURE VISIT (OUTPATIENT)
Dept: UROLOGY | Facility: CLINIC | Age: 75
End: 2020-12-03
Payer: MEDICARE

## 2020-12-03 VITALS
WEIGHT: 172.5 LBS | RESPIRATION RATE: 18 BRPM | SYSTOLIC BLOOD PRESSURE: 110 MMHG | BODY MASS INDEX: 27.07 KG/M2 | DIASTOLIC BLOOD PRESSURE: 73 MMHG | HEART RATE: 62 BPM | HEIGHT: 67 IN | TEMPERATURE: 97 F

## 2020-12-03 DIAGNOSIS — N13.8 BENIGN PROSTATIC HYPERPLASIA WITH URINARY OBSTRUCTION: Primary | ICD-10-CM

## 2020-12-03 DIAGNOSIS — Z01.818 PREOP EXAMINATION: ICD-10-CM

## 2020-12-03 DIAGNOSIS — N40.1 BENIGN PROSTATIC HYPERPLASIA WITH URINARY OBSTRUCTION: Primary | ICD-10-CM

## 2020-12-03 DIAGNOSIS — N13.8 BENIGN PROSTATIC HYPERPLASIA WITH URINARY OBSTRUCTION: ICD-10-CM

## 2020-12-03 DIAGNOSIS — R35.1 NOCTURIA: Primary | ICD-10-CM

## 2020-12-03 DIAGNOSIS — N40.1 BENIGN PROSTATIC HYPERPLASIA WITH URINARY OBSTRUCTION: ICD-10-CM

## 2020-12-03 PROCEDURE — 52000 CYSTOURETHROSCOPY: CPT | Mod: PBBFAC | Performed by: UROLOGY

## 2020-12-03 PROCEDURE — 52000 PR CYSTOURETHROSCOPY: ICD-10-PCS | Mod: S$PBB,,, | Performed by: UROLOGY

## 2020-12-03 PROCEDURE — 52000 CYSTOURETHROSCOPY: CPT | Mod: S$PBB,,, | Performed by: UROLOGY

## 2020-12-03 RX ORDER — LIDOCAINE HYDROCHLORIDE 20 MG/ML
JELLY TOPICAL ONCE
Status: COMPLETED | OUTPATIENT
Start: 2020-12-03 | End: 2020-12-03

## 2020-12-03 RX ORDER — DOXYCYCLINE HYCLATE 100 MG
100 TABLET ORAL ONCE
Status: COMPLETED | OUTPATIENT
Start: 2020-12-03 | End: 2020-12-03

## 2020-12-03 RX ADMIN — LIDOCAINE HYDROCHLORIDE: 20 JELLY TOPICAL at 10:12

## 2020-12-03 RX ADMIN — Medication 100 MG: at 10:12

## 2020-12-03 NOTE — TELEPHONE ENCOUNTER
----- Message from Inessa Stuart sent at 12/3/2020  3:14 PM CST -----  Regarding: FU APPT IN Press  Please schedule this patient for a FU with Dr Aponte in [place] on Monday 1/18/2021 per Dr Aponte request. Patient speaks Kiswahili

## 2020-12-03 NOTE — PROCEDURES
Cystoscopy    Date/Time: 12/3/2020 9:45 AM  Performed by: James Aponte MD  Authorized by: James Aponte MD     Preparation: Patient was prepped and draped in usual sterile fashion       Procedure Date:  12/03/2020      Procedure:  Male Diagnostic Cystourethroscopy    Pre-op diagnosis: BPH with obstruction  Post-op diagnosis: same  Anesthesia: Local  Surgeon:  James Aponte MD    Findings:  Urethra:  Normal urethra.   Sphincter: competent.  Prostate: Estimated Length Prostatic Urethra: 3.5 cm with moderate obstruction  Bladder neck: patent with no stricture  Bladder:  Normal bladder.   Normal ureteral orifices bilaterally.   Moderate trabeculation.     Description of Procedure:                                                         Informed Consent:                                                            - Risks, benefits and alternatives of procedure discussed with               patient and informed consent obtained.       Patient Position:   - Supine. --- Bladder ---   Prep and Drape:   - Patient prepped and draped in usual sterile fashion using povidone     iodine (Betadine).   Instruments:   - 16 Fr flexible cystoscope with 0 degree lens.   Procedure Details:   - Cystoscope passed under vision into bladder.   - Bladder and urethra examined in their entirety with findings as     above.     Conclusion:  1. BPH with obstruction  Responded well to flomax so far but would like to get even better.  I think that he will be a good candidate for Rezum Therapy.  Will plan it in January    Plan:  Patient was discharged home in a stable condition.  Medications: doxy  Follow up:  Rezum Therapy     Nocturia     Benign prostatic hyperplasia with urinary obstruction  Cystoscopy  CBC Auto Differential; Future; Expected date: 12/03/2020  Comprehensive Metabolic Panel; Future; Expected date: 12/03/2020  EKG 12-lead; Future; Expected date: 12/03/2020     Other orders  doxycycline tablet 100 mg  lidocaine HCl 2%  urojet

## 2020-12-03 NOTE — PATIENT INSTRUCTIONS
What to Expect After a Cystoscopy  For the next 24-48 hours, you may feel a mild burning when you urinate. This burning is normal and expected. Drink plenty of water to dilute the urine to help relieve the burning sensation. You may also see a small amount of blood in your urine after the procedure.    Unless you are already taking antibiotics, you may be given an antibiotic after the test to prevent infection.    Signs and Symptoms to Report  Call the [facility] at [phone] if you develop any of the following:  · Fever of 101 degrees or higher  · Chills or persistent bleeding  · Inability to urinate

## 2020-12-10 NOTE — TELEPHONE ENCOUNTER
Benign prostatic hyperplasia with urinary obstruction  -     Case Request Operating Room: DESTRUCTION, PROSTATE, TRANSURETHRAL  -     COVID-19 Routine Screening; Future; Expected date: 01/10/2021    Preop examination  -     COVID-19 Routine Screening; Future; Expected date: 01/10/2021

## 2020-12-14 ENCOUNTER — TELEPHONE (OUTPATIENT)
Dept: UROLOGY | Facility: CLINIC | Age: 75
End: 2020-12-14

## 2020-12-14 NOTE — TELEPHONE ENCOUNTER
He called and would like to cancel his laser TURP surgery in January.  He has done well on medication.  Would like to hold off his surgery until COVID pandemics is under control.  Will see him in 6 months.

## 2021-01-09 ENCOUNTER — IMMUNIZATION (OUTPATIENT)
Dept: INTERNAL MEDICINE | Facility: CLINIC | Age: 76
End: 2021-01-09
Payer: MEDICARE

## 2021-01-09 DIAGNOSIS — Z23 NEED FOR VACCINATION: ICD-10-CM

## 2021-01-09 PROCEDURE — 91300 COVID-19, MRNA, LNP-S, PF, 30 MCG/0.3 ML DOSE VACCINE: CPT | Mod: PBBFAC | Performed by: INTERNAL MEDICINE

## 2021-01-30 ENCOUNTER — IMMUNIZATION (OUTPATIENT)
Dept: INTERNAL MEDICINE | Facility: CLINIC | Age: 76
End: 2021-01-30
Payer: MEDICARE

## 2021-01-30 DIAGNOSIS — Z23 NEED FOR VACCINATION: Primary | ICD-10-CM

## 2021-01-30 PROCEDURE — 91300 PR SARS-COV- 2 COVID-19 VACCINE, NO PRSV, 30MCG/0.3ML, IM: CPT | Mod: PBBFAC

## 2021-01-30 PROCEDURE — 0002A PR IMMUNIZ ADMIN, SARS-COV-2 COVID-19 VACC, 30MCG/0.3ML, 2ND DOSE: CPT | Mod: PBBFAC

## 2021-01-30 RX ADMIN — RNA INGREDIENT BNT-162B2 0.3 ML: 0.23 INJECTION, SUSPENSION INTRAMUSCULAR at 11:01

## 2021-09-30 ENCOUNTER — IMMUNIZATION (OUTPATIENT)
Dept: PRIMARY CARE CLINIC | Facility: CLINIC | Age: 76
End: 2021-09-30
Payer: MEDICARE

## 2021-09-30 DIAGNOSIS — Z23 NEED FOR VACCINATION: Primary | ICD-10-CM

## 2021-09-30 PROCEDURE — 0003A COVID-19, MRNA, LNP-S, PF, 30 MCG/0.3 ML DOSE VACCINE: CPT | Mod: CV19,PBBFAC | Performed by: INTERNAL MEDICINE

## 2021-09-30 PROCEDURE — 91300 COVID-19, MRNA, LNP-S, PF, 30 MCG/0.3 ML DOSE VACCINE: CPT | Mod: PBBFAC | Performed by: INTERNAL MEDICINE

## 2023-05-02 ENCOUNTER — TELEPHONE (OUTPATIENT)
Dept: UROLOGY | Facility: CLINIC | Age: 78
End: 2023-05-02
Payer: MEDICARE

## 2023-05-02 DIAGNOSIS — N40.1 BPH WITH URINARY OBSTRUCTION: Primary | ICD-10-CM

## 2023-05-02 DIAGNOSIS — N13.8 BPH WITH URINARY OBSTRUCTION: Primary | ICD-10-CM

## 2023-05-02 NOTE — TELEPHONE ENCOUNTER
Last seen by me in 12/14/20.  Was scheduled for Rezum Therapy in 1/2021 but it did not happen.  He would like to come and see me.  Please have him do PSA and follow up with me in May.    BPH with urinary obstruction  -     Prostate Specific Antigen, Diagnostic; Future; Expected date: 05/02/2023

## 2023-05-25 ENCOUNTER — LAB VISIT (OUTPATIENT)
Dept: LAB | Facility: HOSPITAL | Age: 78
End: 2023-05-25
Attending: UROLOGY
Payer: MEDICARE

## 2023-05-25 DIAGNOSIS — N40.1 BPH WITH URINARY OBSTRUCTION: ICD-10-CM

## 2023-05-25 DIAGNOSIS — N13.8 BPH WITH URINARY OBSTRUCTION: ICD-10-CM

## 2023-05-25 LAB — COMPLEXED PSA SERPL-MCNC: 2.5 NG/ML (ref 0–4)

## 2023-05-25 PROCEDURE — 36415 COLL VENOUS BLD VENIPUNCTURE: CPT | Performed by: UROLOGY

## 2023-05-25 PROCEDURE — 84153 ASSAY OF PSA TOTAL: CPT | Performed by: UROLOGY

## 2023-05-30 ENCOUNTER — OFFICE VISIT (OUTPATIENT)
Dept: UROLOGY | Facility: CLINIC | Age: 78
End: 2023-05-30
Payer: MEDICARE

## 2023-05-30 VITALS
BODY MASS INDEX: 25.26 KG/M2 | HEART RATE: 55 BPM | DIASTOLIC BLOOD PRESSURE: 68 MMHG | SYSTOLIC BLOOD PRESSURE: 131 MMHG | WEIGHT: 160.94 LBS | HEIGHT: 67 IN

## 2023-05-30 DIAGNOSIS — R35.1 NOCTURIA: ICD-10-CM

## 2023-05-30 DIAGNOSIS — N13.8 BENIGN PROSTATIC HYPERPLASIA WITH URINARY OBSTRUCTION: Primary | ICD-10-CM

## 2023-05-30 DIAGNOSIS — N40.1 BENIGN PROSTATIC HYPERPLASIA WITH URINARY OBSTRUCTION: Primary | ICD-10-CM

## 2023-05-30 DIAGNOSIS — N32.81 OAB (OVERACTIVE BLADDER): ICD-10-CM

## 2023-05-30 PROCEDURE — 99999 PR PBB SHADOW E&M-EST. PATIENT-LVL III: CPT | Mod: PBBFAC,,, | Performed by: UROLOGY

## 2023-05-30 PROCEDURE — 99214 PR OFFICE/OUTPT VISIT, EST, LEVL IV, 30-39 MIN: ICD-10-PCS | Mod: S$PBB,,, | Performed by: UROLOGY

## 2023-05-30 PROCEDURE — 99213 OFFICE O/P EST LOW 20 MIN: CPT | Mod: PBBFAC | Performed by: UROLOGY

## 2023-05-30 PROCEDURE — 99999 PR PBB SHADOW E&M-EST. PATIENT-LVL III: ICD-10-PCS | Mod: PBBFAC,,, | Performed by: UROLOGY

## 2023-05-30 PROCEDURE — 99214 OFFICE O/P EST MOD 30 MIN: CPT | Mod: S$PBB,,, | Performed by: UROLOGY

## 2023-05-30 RX ORDER — ATORVASTATIN CALCIUM 20 MG/1
1 TABLET, FILM COATED ORAL DAILY
COMMUNITY
Start: 2022-07-21

## 2023-05-30 RX ORDER — AMLODIPINE BESYLATE 5 MG/1
1 TABLET ORAL DAILY
COMMUNITY

## 2023-05-30 RX ORDER — DOXYCYCLINE HYCLATE 100 MG
100 TABLET ORAL ONCE
Status: CANCELLED | OUTPATIENT
Start: 2023-05-30 | End: 2023-05-30

## 2023-05-30 RX ORDER — LIDOCAINE HYDROCHLORIDE 20 MG/ML
JELLY TOPICAL ONCE
Status: CANCELLED | OUTPATIENT
Start: 2023-05-30 | End: 2023-05-30

## 2023-05-30 RX ORDER — VALSARTAN 160 MG/1
1 TABLET ORAL DAILY
COMMUNITY
Start: 2023-04-17

## 2023-05-30 RX ORDER — AMLODIPINE, VALSARTAN AND HYDROCHLOROTHIAZIDE 10; 160; 12.5 MG/1; MG/1; MG/1
1 TABLET ORAL DAILY
COMMUNITY
End: 2023-07-18 | Stop reason: CLARIF

## 2023-05-30 NOTE — PROGRESS NOTES
CC: ED and LUTS, nocturia    Pee Alba is a 78 y.o. man who is here for the evaluation of LUTS and ED  A new pt referred by his PCP, Dae Burnett MD   Hx of ED and Benign Prostatic Hypertrophy with prostatitis  a [place] pt seen by me before for prostatitis before with elevated PSA.   He is doing well.   Stopped rapaflo because he felt his urination is back to normal.     c/o ED  Tried pills and found that he did not respond to the pills.  Went to a commercial ED clinic and tried PEP injection.  Disappointed to their service and its cost.  So he tried PEP injection with Karo Roque.  However, he reports that the outcome was as good as before.  So he no longer uses a pep injection.     Positive smokingHx of ED and Benign Prostatic Hypertrophy with prostatitis  a [place] pt seen by me before for prostatitis before with elevated PSA.   He is doing well.   Stopped rapaflo because he felt his urination is back to normal.     c/o ED  Tried pills and found that he did not respond to the pills.  Went to a commercial ED clinic and tried PEP injection.  Disappointed to their service and its cost.  So he tried PEP injection with Karo Roque.  However, he reports that the outcome was as good as before.  So he no longer uses a pep injection.     Positive smoking    Last seen by me in 12/14/20.  Was scheduled for Rezum Therapy in 1/2021 but it did not happen.  He would like to come and see me.    Procedure Date:  12/03/2020  Procedure:  Male Diagnostic Cystourethroscopy  Pre-op diagnosis: BPH with obstruction  Post-op diagnosis: same  Anesthesia: Local  Surgeon:  James Aponte MD  Findings:  Urethra:  Normal urethra.   Sphincter: competent.  Prostate: Estimated Length Prostatic Urethra: 3.5 cm with moderate obstruction  Bladder neck: patent with no stricture  Bladder:  Normal bladder.   Normal ureteral orifices bilaterally.   Moderate trabeculation.    Conclusion:  1. BPH with obstruction  Responded well to flomax so far but  would like to get even better.  I think that he will be a good candidate for Rezum Therapy.  Will plan it in January  Plan:  Follow up:  Rezum Therapy    Pt never followed up for Rezum Therapy.    C/o frequency, urgency, weak urine flow, incomplete bladder emptying, nocturia 2 to 3 x.    Past Medical History:   Diagnosis Date    Aortic aneurysm     Arrhythmia     Hypertension      Past Surgical History:   Procedure Laterality Date    ABDOMINAL SURGERY       Social History     Tobacco Use    Smoking status: Every Day     Packs/day: 0.50     Types: Cigarettes    Smokeless tobacco: Never   Substance Use Topics    Alcohol use: No     History reviewed. No pertinent family history.  Allergy:  Review of patient's allergies indicates:   Allergen Reactions    Iodinated contrast media Anaphylaxis    Iodine      Outpatient Encounter Medications as of 5/30/2023   Medication Sig Dispense Refill    amlodipine-valsartan (EXFORGE)  mg per tablet Take 1 tablet by mouth once daily.      atorvastatin (LIPITOR) 20 MG tablet Take 1 tablet by mouth once daily.      carvedilol (COREG) 12.5 MG tablet Take 12.5 mg by mouth 2 (two) times daily with meals.      omeprazole (PRILOSEC) 20 MG capsule Take 20 mg by mouth once daily.      tamsulosin (FLOMAX) 0.4 mg Cap Take 1 capsule (0.4 mg total) by mouth every evening. 30 capsule 11    torsemide (DEMADEX) 5 MG Tab Take 20 mg by mouth once daily.      valsartan (DIOVAN) 160 MG tablet Take 1 tablet by mouth once daily.      amLODIPine (NORVASC) 5 MG tablet Take 1 tablet by mouth once daily.      amLODIPine-valsartan-hcthiazid -12.5 mg Tab Take 1 tablet by mouth once daily.      sildenafiL (VIAGRA) 100 MG tablet TAKE 1 TABLET (100 MG TOTAL) BY MOUTH DAILY AS NEEDED. 30 tablet 3    tadalafiL (CIALIS) 5 MG tablet Take 1 tablet (5 mg total) by mouth daily as needed for Erectile Dysfunction. 30 tablet 11     No facility-administered encounter medications on file as of 5/30/2023.     Review  of Systems   ROS  Physical Exam     Vitals:    05/30/23 1102   BP: 131/68   Pulse: (!) 55     Physical Exam  Constitutional:       General: He is not in acute distress.     Appearance: He is well-developed. He is not diaphoretic.   HENT:      Head: Normocephalic and atraumatic.      Right Ear: External ear normal.      Left Ear: External ear normal.      Nose: Nose normal.   Eyes:      Conjunctiva/sclera: Conjunctivae normal.      Pupils: Pupils are equal, round, and reactive to light.   Neck:      Thyroid: No thyromegaly.      Vascular: No JVD.      Trachea: No tracheal deviation.   Cardiovascular:      Rate and Rhythm: Normal rate and regular rhythm.      Heart sounds: Normal heart sounds. No murmur heard.    No friction rub. No gallop.   Pulmonary:      Effort: Pulmonary effort is normal. No respiratory distress.      Breath sounds: Normal breath sounds. No wheezing.   Chest:      Chest wall: No tenderness.   Abdominal:      General: Bowel sounds are normal. There is no distension.      Palpations: Abdomen is soft. There is no mass.      Tenderness: There is no abdominal tenderness. There is no guarding or rebound.   Genitourinary:     Penis: Normal. No tenderness.       Rectum: Normal.      Comments: Prostate 25 grams with negative nodule or negative tenderness    Musculoskeletal:         General: No tenderness or deformity. Normal range of motion.      Cervical back: Normal range of motion and neck supple.   Lymphadenopathy:      Cervical: No cervical adenopathy.   Skin:     General: Skin is warm and dry.   Neurological:      Mental Status: He is alert and oriented to person, place, and time.   Psychiatric:         Behavior: Behavior normal.         Thought Content: Thought content normal.       LABS:  Lab Results   Component Value Date    PSADIAG 2.5 05/25/2023    PSADIAG 2.2 10/27/2020    PSADIAG 2.5 06/25/2019    PSADIAG 2.2 01/24/2017    PSADIAG 3.3 01/12/2016    PSADIAG 3.1 11/18/2014    PSADIAG 29.9 (H)  03/13/2014     Results for orders placed or performed in visit on 05/25/23   Prostate Specific Antigen, Diagnostic   Result Value Ref Range    PSA Diagnostic 2.5 0.00 - 4.00 ng/mL   Results for orders placed or performed in visit on 10/27/20   Prostate Specific Antigen, Diagnostic   Result Value Ref Range    PSA Diagnostic 2.2 0.00 - 4.00 ng/mL   Results for orders placed or performed in visit on 06/25/19   Prostate Specific Antigen, Diagnostic   Result Value Ref Range    PSA Diagnostic 2.5 0.00 - 4.00 ng/mL     Lab Results   Component Value Date    CREATININE 0.9 06/25/2019    CREATININE 1.2 03/14/2014    CREATININE 1.2 03/13/2014     Results for orders placed or performed in visit on 06/25/19   Testosterone   Result Value Ref Range    Testosterone, Total 445 304 - 1227 ng/dL     Urine Culture, Routine   Date Value Ref Range Status   03/13/2014 No significant growth  Final     No results found for: HGBA1C    Radiology:    Assessment and Plan:  Pee was seen today for follow-up.    Diagnoses and all orders for this visit:    Benign prostatic hyperplasia with urinary obstruction  -     Simple Urodynamics w/ Cysto; Future    OAB (overactive bladder)  -     Simple Urodynamics w/ Cysto; Future  -     Urine Culture High Risk; Standing  -     POCT Urinalysis; Standing  -     Urine Culture High Risk    Nocturia  -     Simple Urodynamics w/ Cysto; Future    Other orders  -     LIDOcaine HCl 2% urojet  -     doxycycline tablet 100 mg    Continue flomax for now.  Will further evaluate him with SUDS cysto.  Voiding diary day vs. Night for 3 days.    Follow-up:  Follow up voiding diary day and night.

## 2023-06-08 ENCOUNTER — PROCEDURE VISIT (OUTPATIENT)
Dept: UROLOGY | Facility: CLINIC | Age: 78
End: 2023-06-08
Payer: MEDICARE

## 2023-06-08 VITALS
SYSTOLIC BLOOD PRESSURE: 176 MMHG | WEIGHT: 170 LBS | TEMPERATURE: 98 F | BODY MASS INDEX: 26.68 KG/M2 | HEIGHT: 67 IN | HEART RATE: 51 BPM | DIASTOLIC BLOOD PRESSURE: 78 MMHG

## 2023-06-08 DIAGNOSIS — N32.81 OAB (OVERACTIVE BLADDER): ICD-10-CM

## 2023-06-08 DIAGNOSIS — R35.1 NOCTURIA: ICD-10-CM

## 2023-06-08 DIAGNOSIS — N40.1 BENIGN PROSTATIC HYPERPLASIA WITH URINARY OBSTRUCTION: ICD-10-CM

## 2023-06-08 DIAGNOSIS — N13.8 BENIGN PROSTATIC HYPERPLASIA WITH URINARY OBSTRUCTION: ICD-10-CM

## 2023-06-08 PROCEDURE — 51741 ELECTRO-UROFLOWMETRY FIRST: CPT | Mod: 26,S$PBB,51, | Performed by: UROLOGY

## 2023-06-08 PROCEDURE — 52000 PR CYSTOURETHROSCOPY: ICD-10-PCS | Mod: S$PBB,59,, | Performed by: UROLOGY

## 2023-06-08 PROCEDURE — 51784 PR ANAL/URINARY MUSCLE STUDY: ICD-10-PCS | Mod: 26,S$PBB,51, | Performed by: UROLOGY

## 2023-06-08 PROCEDURE — 51784 ANAL/URINARY MUSCLE STUDY: CPT | Mod: PBBFAC | Performed by: UROLOGY

## 2023-06-08 PROCEDURE — 51701 INSERT BLADDER CATHETER: CPT | Mod: PBBFAC | Performed by: UROLOGY

## 2023-06-08 PROCEDURE — 52000 CYSTOURETHROSCOPY: CPT | Mod: PBBFAC | Performed by: UROLOGY

## 2023-06-08 PROCEDURE — 52000 CYSTOURETHROSCOPY: CPT | Mod: S$PBB,59,, | Performed by: UROLOGY

## 2023-06-08 PROCEDURE — 51727 CYSTOMETROGRAM W/UP: ICD-10-PCS | Mod: 26,S$PBB,, | Performed by: UROLOGY

## 2023-06-08 PROCEDURE — 51741 PR UROFLOWMETRY, COMPLEX: ICD-10-PCS | Mod: 26,S$PBB,51, | Performed by: UROLOGY

## 2023-06-08 PROCEDURE — 51728 CYSTOMETROGRAM W/VP: CPT | Mod: PBBFAC | Performed by: UROLOGY

## 2023-06-08 PROCEDURE — 51784 ANAL/URINARY MUSCLE STUDY: CPT | Mod: 26,S$PBB,51, | Performed by: UROLOGY

## 2023-06-08 PROCEDURE — 51727 CYSTOMETROGRAM W/UP: CPT | Mod: PBBFAC | Performed by: UROLOGY

## 2023-06-08 PROCEDURE — 51727 CYSTOMETROGRAM W/UP: CPT | Mod: 26,S$PBB,, | Performed by: UROLOGY

## 2023-06-08 PROCEDURE — 51741 ELECTRO-UROFLOWMETRY FIRST: CPT | Mod: PBBFAC | Performed by: UROLOGY

## 2023-06-08 RX ORDER — LIDOCAINE HYDROCHLORIDE 20 MG/ML
JELLY TOPICAL ONCE
Status: COMPLETED | OUTPATIENT
Start: 2023-06-08 | End: 2023-06-08

## 2023-06-08 RX ORDER — DOXYCYCLINE HYCLATE 100 MG
100 TABLET ORAL ONCE
Status: COMPLETED | OUTPATIENT
Start: 2023-06-08 | End: 2023-06-08

## 2023-06-08 RX ADMIN — Medication 100 MG: at 04:06

## 2023-06-08 RX ADMIN — LIDOCAINE HYDROCHLORIDE: 20 JELLY TOPICAL at 04:06

## 2023-06-08 NOTE — PATIENT INSTRUCTIONS
_                                                                                                                                                                                             If any problems after hours or weekends, you may call [phone] and ask for the urology resident on call. SIMPLE URODYNAMIC STUDY (SUDS) & CYSTOSCOPY  UROLOGY CLINIC DISCHARGE INSTRUCTIONS    You have had a procedure that will require time to properly heal. Follow the instructions you have been given on how to care for yourself once you are home. Below is additional information to help in your recovery.    ACTIVITY  There are no restrictions in activity. Start doing again the things you did before the procedure.  You may experience a slight burning sensation. You may notice a small amount of blood in your urine. This will clear up within a day. Call the clinic if this continues beyond 48 hours.    DIET  Continue your normal diet. You may eat the same foods you ate before your procedure.  Drink plenty of fluids during the first 24-48 hours following your procedure.    MEDICATIONS  Resume all other previous medications from your prescribing physician.  Continue any pre=procedure antibiotics until they are all gone.    SIGNS AND SYMPTOMS TO REPORT TO THE DOCTOR  Chills or fever greater than 101° F within 24 hours of procedure.  Changes in urination, such as increased bleeding, foul smell, cloudy urine, or painful urination.  Call your doctor with any questions or concerns.    For any emergency situation, call 911 immediately or go to your nearest emergency room.    [facility]  [phone]

## 2023-06-08 NOTE — PROCEDURES
Simple Urodynamics w/ Cysto    Date/Time: 6/8/2023 2:15 PM  Performed by: James Aponte MD  Authorized by: James Aponte MD   Comments: Procedure Date:  06/08/2023    Procedure:   Diagnostic Cystourethroscopy   Complex Cystometrogram   Voiding / Pressure Study with Intrarectal Balloon   Complex Uroflow   Electromyogram of Anal Sphincter.     Pre-OP Diagnosis:   Weak urine flow, urgency, frequency, nocturia   Post-OP Diagnosis:   same   Anesthesia:   Anesthesia Administered:   Intraurethral instillation of 10 mL 2% lidocaine (Xylocaine) jelly.   Findings:   Uroflow: voided 102 ml, Qmax 4 ml/sec, PVR 5 ml  --- Bladder ---   CYSTOMETROGRAM ( Filling Phase ):   Cystometric Numeric Data:   - First Desire (Sensation): 126 mL at 1cm of water.   - Normal Desire: 143 mL at 1 cm of water.   - Strong Desire: 250 mL at 3 cm of water.   - Urgency (Imminent Void) : 293 mL at 2cm of water.   - Maximum Cystometric Capacity: 395 mL.   Compliance:   - normal  Leak Point Pressure:   - Valsalva ( Abdominal ) Leak Point Pressure: none.   UROFLOW:   Unable to void with an indwelling catheter.  Urethral catheter removed.  He was able to void completely.    VOIDING PRESSURE STUDY ( Voiding Phase ):   Detrusor Pressure:   - Maximum Detrusor Pressure: n/a cm of water.   - Detrusor Pressure at Maximum Flow: n/a cm of water.   - Detrusor Contraction Characteristics: unknown contraction(s).   ELECTROMYOGRAM:   - increased activity at the time of trying to urinate.      ---Diagnostic Cystourethroscopy ---   Normal urethra.    Prostate: 3.5 cm bilateral but asymmetric obstruction, right side is more obstructive than the left side.  Width of Bladder Neck Opening: Approximately 18 Fr.   Normal bladder. Hyperemic bladder mucosa ( IC ?), 2+ trabeculation, occasional cellules.  Normal ureteral orifices bilaterally.       Description of Procedure:   Informed Consent:   - Risks, benefits and alternatives of procedure discussed with   patient and  informed consent obtained.   Patient Position:   - Supine.   --- Bladder ---   Prep and Drape:   - Patient prepped and draped in usual sterile fashion using povidone   iodine (Betadine).   --- Diagnostic Cystourethroscopy ---   Instruments:   - 16 Fr flexible cystoscope with 0 degree lens.   Procedure Details:   - Cystoscope passed under vision into bladder.   - Bladder and urethra examined in their entirety with findings as   above.   --- Urodynamic Studies ---   Procedure Details:   Cystometrogram:   - Catheter(s) passed into the bladder.   - Rectal balloon inserted.   - Catheter(s) connected to infusion medium and to pressure recording   device.   - Infusion Rate: 30 mL / min.   Electromyogram:   - Perineal electromyogram pad placed and connected to electromyogram   recording device.   Equipment:   - Catheters: Double lumen catheter.   - Medium: Liquid.   - Pressure Recording Device: Calibrated electronic equipment.   Complications:   No immediate complications.    CONCLUSIONS:   1. BPH with obstruction  2. OAB with possible IC    Avoid bladder irritants  Alkaline water.    Recommend laser TURP.  Nature and risks of operation explained.  A brochure given.    Post-OP Plan:   Patient was discharged home in a stable condition.  Medications: doxy  Follow up:  surgery on 7/19/23    · Benign prostatic hyperplasia with urinary obstruction   Simple Urodynamics w/ Cysto   CBC Auto Differential; Future; Expected date: 06/08/2023   Comprehensive Metabolic Panel; Future; Expected date: 06/08/2023   EKG 12-lead; Future; Expected date: 06/08/2023    · OAB (overactive bladder)   Simple Urodynamics w/ Cysto    · Nocturia   Simple Urodynamics w/ Cysto    · Other orders   LIDOcaine HCl 2% urojet   doxycycline tablet 100 mg

## 2023-06-12 ENCOUNTER — TELEPHONE (OUTPATIENT)
Dept: UROLOGY | Facility: CLINIC | Age: 78
End: 2023-06-12
Payer: MEDICARE

## 2023-06-12 DIAGNOSIS — N40.1 BPH WITH URINARY OBSTRUCTION: Primary | ICD-10-CM

## 2023-06-12 DIAGNOSIS — N13.8 BPH WITH URINARY OBSTRUCTION: Primary | ICD-10-CM

## 2023-06-12 DIAGNOSIS — N32.81 OAB (OVERACTIVE BLADDER): ICD-10-CM

## 2023-06-12 NOTE — TELEPHONE ENCOUNTER
BPH with urinary obstruction  -     Case Request Operating Room: TURP, USING LASER    OAB (overactive bladder)  -     Case Request Operating Room: TURP, USING LASER

## 2023-07-10 ENCOUNTER — HOSPITAL ENCOUNTER (OUTPATIENT)
Dept: CARDIOLOGY | Facility: CLINIC | Age: 78
Discharge: HOME | End: 2023-07-10
Payer: MEDICARE

## 2023-07-10 DIAGNOSIS — N40.1 BENIGN PROSTATIC HYPERPLASIA WITH URINARY OBSTRUCTION: ICD-10-CM

## 2023-07-10 DIAGNOSIS — N13.8 BENIGN PROSTATIC HYPERPLASIA WITH URINARY OBSTRUCTION: ICD-10-CM

## 2023-07-10 PROCEDURE — 93010 EKG 12-LEAD: ICD-10-PCS | Mod: S$PBB,,, | Performed by: INTERNAL MEDICINE

## 2023-07-10 PROCEDURE — 93010 ELECTROCARDIOGRAM REPORT: CPT | Mod: S$PBB,,, | Performed by: INTERNAL MEDICINE

## 2023-07-10 PROCEDURE — 93005 ELECTROCARDIOGRAM TRACING: CPT | Mod: PBBFAC | Performed by: INTERNAL MEDICINE

## 2023-07-18 ENCOUNTER — TELEPHONE (OUTPATIENT)
Dept: UROLOGY | Facility: CLINIC | Age: 78
End: 2023-07-18
Payer: MEDICARE

## 2023-07-18 NOTE — TELEPHONE ENCOUNTER
Called pt to confirm arrival time of 8:15am for procedure on 7/19/23. Gave pt NPO instructions and gave pt opportunity to ask questions. Pt verbalized understanding.

## 2023-07-18 NOTE — PRE-PROCEDURE INSTRUCTIONS
PreOp Instructions given:   - Verbal medication information (what to hold and what to take)   - NPO guidelines   - Arrival place directions given; time to be given the day before procedure by the   Surgeon's Office   - Bathing with antibacterial soap   - Don't wear any jewelry or bring any valuables AM of surgery   - No makeup or moisturizer to face   - No perfume/cologne, powder, lotions or aftershave   Pt. verbalized understanding.   Pt denies any h/o Anesthesia/Sedation complications or side effects.  Patient does not know arrival time.  Explained that this information comes from the surgeon's office and if they haven't heard from them by 4 pm to call the office.  Patient stated an understanding.

## 2023-07-19 ENCOUNTER — TELEPHONE (OUTPATIENT)
Dept: UROLOGY | Facility: CLINIC | Age: 78
End: 2023-07-19
Payer: MEDICARE

## 2023-07-19 ENCOUNTER — ANESTHESIA EVENT (OUTPATIENT)
Dept: SURGERY | Facility: HOSPITAL | Age: 78
End: 2023-07-19
Payer: MEDICARE

## 2023-07-19 ENCOUNTER — HOSPITAL ENCOUNTER (OUTPATIENT)
Facility: HOSPITAL | Age: 78
Discharge: HOME | End: 2023-07-19
Attending: UROLOGY | Admitting: UROLOGY
Payer: MEDICARE

## 2023-07-19 ENCOUNTER — ANESTHESIA (OUTPATIENT)
Dept: SURGERY | Facility: HOSPITAL | Age: 78
End: 2023-07-19
Payer: MEDICARE

## 2023-07-19 VITALS
TEMPERATURE: 98 F | HEIGHT: 67 IN | SYSTOLIC BLOOD PRESSURE: 118 MMHG | HEART RATE: 60 BPM | RESPIRATION RATE: 18 BRPM | WEIGHT: 170 LBS | OXYGEN SATURATION: 100 % | BODY MASS INDEX: 26.68 KG/M2 | DIASTOLIC BLOOD PRESSURE: 67 MMHG

## 2023-07-19 DIAGNOSIS — N32.81 OAB (OVERACTIVE BLADDER): ICD-10-CM

## 2023-07-19 DIAGNOSIS — N13.8 BPH WITH URINARY OBSTRUCTION: Primary | ICD-10-CM

## 2023-07-19 DIAGNOSIS — N40.1 BPH WITH URINARY OBSTRUCTION: Primary | ICD-10-CM

## 2023-07-19 DIAGNOSIS — N40.0 BPH (BENIGN PROSTATIC HYPERPLASIA): Primary | ICD-10-CM

## 2023-07-19 PROCEDURE — D9220A PRA ANESTHESIA: ICD-10-PCS | Mod: CRNA,,, | Performed by: NURSE ANESTHETIST, CERTIFIED REGISTERED

## 2023-07-19 PROCEDURE — 36000707: Performed by: UROLOGY

## 2023-07-19 PROCEDURE — 36000706: Performed by: UROLOGY

## 2023-07-19 PROCEDURE — 37000009 HC ANESTHESIA EA ADD 15 MINS: Performed by: UROLOGY

## 2023-07-19 PROCEDURE — 25000003 PHARM REV CODE 250: Performed by: STUDENT IN AN ORGANIZED HEALTH CARE EDUCATION/TRAINING PROGRAM

## 2023-07-19 PROCEDURE — 63600175 PHARM REV CODE 636 W HCPCS: Performed by: NURSE ANESTHETIST, CERTIFIED REGISTERED

## 2023-07-19 PROCEDURE — 71000015 HC POSTOP RECOV 1ST HR: Performed by: UROLOGY

## 2023-07-19 PROCEDURE — 25000003 PHARM REV CODE 250: Performed by: NURSE ANESTHETIST, CERTIFIED REGISTERED

## 2023-07-19 PROCEDURE — 63600175 PHARM REV CODE 636 W HCPCS: Performed by: STUDENT IN AN ORGANIZED HEALTH CARE EDUCATION/TRAINING PROGRAM

## 2023-07-19 PROCEDURE — D9220A PRA ANESTHESIA: ICD-10-PCS | Mod: ANES,,, | Performed by: STUDENT IN AN ORGANIZED HEALTH CARE EDUCATION/TRAINING PROGRAM

## 2023-07-19 PROCEDURE — 37000008 HC ANESTHESIA 1ST 15 MINUTES: Performed by: UROLOGY

## 2023-07-19 PROCEDURE — D9220A PRA ANESTHESIA: Mod: ANES,,, | Performed by: STUDENT IN AN ORGANIZED HEALTH CARE EDUCATION/TRAINING PROGRAM

## 2023-07-19 PROCEDURE — D9220A PRA ANESTHESIA: Mod: CRNA,,, | Performed by: NURSE ANESTHETIST, CERTIFIED REGISTERED

## 2023-07-19 RX ORDER — GLYCOPYRROLATE 0.2 MG/ML
INJECTION INTRAMUSCULAR; INTRAVENOUS
Status: DISCONTINUED | OUTPATIENT
Start: 2023-07-19 | End: 2023-07-19

## 2023-07-19 RX ORDER — HYDROCODONE BITARTRATE AND ACETAMINOPHEN 5; 325 MG/1; MG/1
1 TABLET ORAL EVERY 6 HOURS PRN
Qty: 5 TABLET | Refills: 0 | Status: CANCELLED | OUTPATIENT
Start: 2023-07-19

## 2023-07-19 RX ORDER — PHENYLEPHRINE HYDROCHLORIDE 10 MG/ML
INJECTION INTRAVENOUS
Status: DISCONTINUED | OUTPATIENT
Start: 2023-07-19 | End: 2023-07-19

## 2023-07-19 RX ORDER — SODIUM CHLORIDE 0.9 % (FLUSH) 0.9 %
3 SYRINGE (ML) INJECTION EVERY 4 HOURS PRN
Status: DISCONTINUED | OUTPATIENT
Start: 2023-07-19 | End: 2023-07-19 | Stop reason: HOSPADM

## 2023-07-19 RX ORDER — ONDANSETRON 2 MG/ML
INJECTION INTRAMUSCULAR; INTRAVENOUS
Status: DISCONTINUED | OUTPATIENT
Start: 2023-07-19 | End: 2023-07-19

## 2023-07-19 RX ORDER — PROPOFOL 10 MG/ML
VIAL (ML) INTRAVENOUS
Status: DISCONTINUED | OUTPATIENT
Start: 2023-07-19 | End: 2023-07-19

## 2023-07-19 RX ORDER — FENTANYL CITRATE 50 UG/ML
25 INJECTION, SOLUTION INTRAMUSCULAR; INTRAVENOUS EVERY 5 MIN PRN
Status: DISCONTINUED | OUTPATIENT
Start: 2023-07-19 | End: 2023-07-19 | Stop reason: HOSPADM

## 2023-07-19 RX ORDER — LIDOCAINE HYDROCHLORIDE 20 MG/ML
INJECTION INTRAVENOUS
Status: DISCONTINUED | OUTPATIENT
Start: 2023-07-19 | End: 2023-07-19

## 2023-07-19 RX ORDER — SULFAMETHOXAZOLE AND TRIMETHOPRIM 800; 160 MG/1; MG/1
1 TABLET ORAL 2 TIMES DAILY
Qty: 10 TABLET | Refills: 0 | Status: CANCELLED | OUTPATIENT
Start: 2023-07-19 | End: 2023-07-24

## 2023-07-19 RX ADMIN — LIDOCAINE HYDROCHLORIDE 100 MG: 20 INJECTION INTRAVENOUS at 10:07

## 2023-07-19 RX ADMIN — PHENYLEPHRINE HYDROCHLORIDE 100 MCG: 10 INJECTION INTRAVENOUS at 10:07

## 2023-07-19 RX ADMIN — GLYCOPYRROLATE 0.2 MG: 0.2 INJECTION INTRAMUSCULAR; INTRAVENOUS at 10:07

## 2023-07-19 RX ADMIN — CEFAZOLIN 2 G: 2 INJECTION, POWDER, FOR SOLUTION INTRAMUSCULAR; INTRAVENOUS at 10:07

## 2023-07-19 RX ADMIN — ONDANSETRON 4 MG: 2 INJECTION INTRAMUSCULAR; INTRAVENOUS at 10:07

## 2023-07-19 RX ADMIN — PROPOFOL 150 MG: 10 INJECTION, EMULSION INTRAVENOUS at 10:07

## 2023-07-19 RX ADMIN — SODIUM CHLORIDE: 0.9 INJECTION, SOLUTION INTRAVENOUS at 10:07

## 2023-07-19 NOTE — PROGRESS NOTES
Patient discharged home with written/discharge instructions. Verbalized understanding.     Denies complaints of pain, nausea, or associated complaints.     IV removed without complications noted.     Respirations equal, non-labored with no apparent distress noted.     Skin pink, warm, dry and intact.    Assisted to wheelchair without incident.     [facility]

## 2023-07-19 NOTE — BRIEF OP NOTE
[facility] - [facility] (1st Fl)  Brief Operative Note    Surgery Date: 7/19/2023     Surgeon(s) and Role:     * James Aponte MD - Primary     * Giovanni Alexander MD - Resident - Assisting     * Ronald Teresa DO - Resident - Assisting        Pre-op Diagnosis:  BPH with urinary obstruction [N40.1, N13.8]  OAB (overactive bladder) [N32.81]    Post-op Diagnosis:  Post-Op Diagnosis Codes:     * BPH with urinary obstruction [N40.1, N13.8]     * OAB (overactive bladder) [N32.81]    Procedure(s) (LRB):  TURP, USING LASER (N/A)  CYSTOSCOPY (N/A)  NOT PERFORMED    Anesthesia: General    Operative Findings: After induction of anesthesia, laser malfunctioned, decision made to cancel case and reschedule for later date    Estimated Blood Loss: * No values recorded between 7/19/2023 12:00 AM and 7/19/2023 10:33 AM *         Specimens:   Specimen (24h ago, onward)      None              Discharge Note    OUTCOME:  Procedure not performed    DISPOSITION: Home or Self Care    FINAL DIAGNOSIS:  Benign prostatic hyperplasia with urinary obstruction    FOLLOWUP: In clinic    DISCHARGE INSTRUCTIONS:    Discharge Procedure Orders   Notify your health care provider if you experience any of the following:  temperature >100.4     Notify your health care provider if you experience any of the following:  persistent nausea and vomiting or diarrhea     Notify your health care provider if you experience any of the following:  severe uncontrolled pain     Notify your health care provider if you experience any of the following:  difficulty breathing or increased cough     Notify your health care provider if you experience any of the following:  severe persistent headache     Notify your health care provider if you experience any of the following:  persistent dizziness, light-headedness, or visual disturbances     Notify your health care provider if you experience any of the following:  increased confusion or weakness

## 2023-07-19 NOTE — H&P
CC: ED and LUTS, nocturia    Pee Alba is a 78 y.o. man who is here for the evaluation of LUTS and ED  A new pt referred by his PCP, Dae Burnett MD   Hx of ED and Benign Prostatic Hypertrophy with prostatitis  a [place] pt seen by me before for prostatitis before with elevated PSA.   He is doing well.   Stopped rapaflo because he felt his urination is back to normal.     c/o ED  Tried pills and found that he did not respond to the pills.  Went to a commercial ED clinic and tried PEP injection.  Disappointed to their service and its cost.  So he tried PEP injection with Karo Roque.  However, he reports that the outcome was as good as before.  So he no longer uses a pep injection.     Positive smokingHx of ED and Benign Prostatic Hypertrophy with prostatitis  a [place] pt seen by me before for prostatitis before with elevated PSA.   He is doing well.   Stopped rapaflo because he felt his urination is back to normal.     c/o ED  Tried pills and found that he did not respond to the pills.  Went to a commercial ED clinic and tried PEP injection.  Disappointed to their service and its cost.  So he tried PEP injection with Karo Roque.  However, he reports that the outcome was as good as before.  So he no longer uses a pep injection.     Positive smoking    Last seen by me in 12/14/20.  Was scheduled for Rezum Therapy in 1/2021 but it did not happen.  He would like to come and see me.    Procedure Date:  12/03/2020  Procedure:  Male Diagnostic Cystourethroscopy  Pre-op diagnosis: BPH with obstruction  Post-op diagnosis: same  Anesthesia: Local  Surgeon:  James Aponte MD  Findings:  Urethra:  Normal urethra.   Sphincter: competent.  Prostate: Estimated Length Prostatic Urethra: 3.5 cm with moderate obstruction  Bladder neck: patent with no stricture  Bladder:  Normal bladder.   Normal ureteral orifices bilaterally.   Moderate trabeculation.    Conclusion:  1. BPH with obstruction  Responded well to flomax so far but  would like to get even better.  I think that he will be a good candidate for Rezum Therapy.  Will plan it in January  Plan:  Follow up:  Rezum Therapy    Pt never followed up for Rezum Therapy.    C/o frequency, urgency, weak urine flow, incomplete bladder emptying, nocturia 2 to 3 x.    Past Medical History:   Diagnosis Date    Aortic aneurysm     Arrhythmia     Hypertension      Past Surgical History:   Procedure Laterality Date    ABDOMINAL SURGERY       Social History     Tobacco Use    Smoking status: Every Day     Packs/day: 0.50     Types: Cigarettes    Smokeless tobacco: Never   Substance Use Topics    Alcohol use: No     History reviewed. No pertinent family history.  Allergy:  Review of patient's allergies indicates:   Allergen Reactions    Iodinated contrast media Anaphylaxis    Iodine      Outpatient Encounter Medications as of 5/30/2023   Medication Sig Dispense Refill    amlodipine-valsartan (EXFORGE)  mg per tablet Take 1 tablet by mouth once daily.      atorvastatin (LIPITOR) 20 MG tablet Take 1 tablet by mouth once daily.      carvedilol (COREG) 12.5 MG tablet Take 12.5 mg by mouth 2 (two) times daily with meals.      omeprazole (PRILOSEC) 20 MG capsule Take 20 mg by mouth once daily.      tamsulosin (FLOMAX) 0.4 mg Cap Take 1 capsule (0.4 mg total) by mouth every evening. 30 capsule 11    torsemide (DEMADEX) 5 MG Tab Take 20 mg by mouth once daily.      valsartan (DIOVAN) 160 MG tablet Take 1 tablet by mouth once daily.      amLODIPine (NORVASC) 5 MG tablet Take 1 tablet by mouth once daily.      amLODIPine-valsartan-hcthiazid -12.5 mg Tab Take 1 tablet by mouth once daily.      sildenafiL (VIAGRA) 100 MG tablet TAKE 1 TABLET (100 MG TOTAL) BY MOUTH DAILY AS NEEDED. 30 tablet 3    tadalafiL (CIALIS) 5 MG tablet Take 1 tablet (5 mg total) by mouth daily as needed for Erectile Dysfunction. 30 tablet 11     No facility-administered encounter medications on file as of 5/30/2023.     Review  of Systems   ROS  Physical Exam     Vitals:    05/30/23 1102   BP: 131/68   Pulse: (!) 55     Physical Exam  Constitutional:       General: He is not in acute distress.     Appearance: He is well-developed. He is not diaphoretic.   HENT:      Head: Normocephalic and atraumatic.      Right Ear: External ear normal.      Left Ear: External ear normal.      Nose: Nose normal.   Eyes:      Conjunctiva/sclera: Conjunctivae normal.      Pupils: Pupils are equal, round, and reactive to light.   Neck:      Thyroid: No thyromegaly.      Vascular: No JVD.      Trachea: No tracheal deviation.   Cardiovascular:      Rate and Rhythm: Normal rate and regular rhythm.      Heart sounds: Normal heart sounds. No murmur heard.    No friction rub. No gallop.   Pulmonary:      Effort: Pulmonary effort is normal. No respiratory distress.      Breath sounds: Normal breath sounds. No wheezing.   Chest:      Chest wall: No tenderness.   Abdominal:      General: Bowel sounds are normal. There is no distension.      Palpations: Abdomen is soft. There is no mass.      Tenderness: There is no abdominal tenderness. There is no guarding or rebound.   Genitourinary:     Penis: Normal. No tenderness.       Rectum: Normal.      Comments: Prostate 25 grams with negative nodule or negative tenderness    Musculoskeletal:         General: No tenderness or deformity. Normal range of motion.      Cervical back: Normal range of motion and neck supple.   Lymphadenopathy:      Cervical: No cervical adenopathy.   Skin:     General: Skin is warm and dry.   Neurological:      Mental Status: He is alert and oriented to person, place, and time.   Psychiatric:         Behavior: Behavior normal.         Thought Content: Thought content normal.       LABS:  Lab Results   Component Value Date    PSADIAG 2.5 05/25/2023    PSADIAG 2.2 10/27/2020    PSADIAG 2.5 06/25/2019    PSADIAG 2.2 01/24/2017    PSADIAG 3.3 01/12/2016    PSADIAG 3.1 11/18/2014    PSADIAG 29.9 (H)  03/13/2014     Results for orders placed or performed in visit on 05/25/23   Prostate Specific Antigen, Diagnostic   Result Value Ref Range    PSA Diagnostic 2.5 0.00 - 4.00 ng/mL   Results for orders placed or performed in visit on 10/27/20   Prostate Specific Antigen, Diagnostic   Result Value Ref Range    PSA Diagnostic 2.2 0.00 - 4.00 ng/mL   Results for orders placed or performed in visit on 06/25/19   Prostate Specific Antigen, Diagnostic   Result Value Ref Range    PSA Diagnostic 2.5 0.00 - 4.00 ng/mL     Lab Results   Component Value Date    CREATININE 0.9 06/25/2019    CREATININE 1.2 03/14/2014    CREATININE 1.2 03/13/2014     Results for orders placed or performed in visit on 06/25/19   Testosterone   Result Value Ref Range    Testosterone, Total 445 304 - 1227 ng/dL     Urine Culture, Routine   Date Value Ref Range Status   03/13/2014 No significant growth  Final     No results found for: HGBA1C    Radiology:    Assessment and Plan:  Pee was seen today for follow-up.    Diagnoses and all orders for this visit:    Benign prostatic hyperplasia with urinary obstruction  -     Simple Urodynamics w/ Cysto; Future    OAB (overactive bladder)  -     Simple Urodynamics w/ Cysto; Future  -     Urine Culture High Risk; Standing  -     POCT Urinalysis; Standing  -     Urine Culture High Risk    Nocturia  -     Simple Urodynamics w/ Cysto; Future    Other orders  -     LIDOcaine HCl 2% urojet  -     doxycycline tablet 100 mg      Patient was assessed preoperatively, found to be appropriate in behavior. The risks, benefits, and alternatives to procedures were discussed, and questions were answered. Plan to proceed with described procedure.    Urine negative for all tested components.

## 2023-07-19 NOTE — OP NOTE
[facility]  Operative Note    Date: 07/19/2023    Pre-Op Diagnosis: BPH  Patient Active Problem List    Diagnosis Date Noted    OAB (overactive bladder) 06/08/2023    ED (erectile dysfunction) of organic origin 01/12/2016    Elevated PSA 01/12/2016    Nocturia 04/11/2014    Frequency 04/11/2014    Prostatitis 03/13/2014    Benign prostatic hyperplasia with urinary obstruction 03/13/2014     Post-Op Diagnosis: same    Procedure(s) Performed:   1.  None    Specimen(s): None    Staff Surgeon: James Aponte MD    Assistant Surgeon: Giovanni Alexander MD    Anesthesia: General LMA anesthesia    Indications: Pee Alba is a 78 y.o. male with BPH    Findings: Procedure not performed    Estimated Blood Loss: min    Drains: none    Procedure in Detail:  After risks, benefits and possible complications of Laser TURP were discussed, the patient elected to undergo the procedure and informed consent was obtained. All questions were answered in the lindsey-operative area. The patient was transferred to the cystoscopy suite and placed on the fluoroscopy table in the supine position. Anesthesia was administered.     After induction of anesthesia, the laser malfunctioned and was unable to be fixed. The procedure was cancelled.     Follow up care:  The patient will follow up with Dr. Aponte to reschedule surgery.    Giovanni Alexander MD    I present in the OR with the resident for the time of the surgery.   Unable to perform the surgery due to laser equipment failure.

## 2023-07-19 NOTE — TRANSFER OF CARE
"Anesthesia Transfer of Care Note    Patient: Pee Alba    Procedure(s) Performed: Procedure(s) (LRB):  TURP, USING LASER (N/A)  CYSTOSCOPY (N/A)    Patient location: PACU    Anesthesia Type: general    Transport from OR: Transported from OR on 6-10 L/min O2 by face mask with adequate spontaneous ventilation    Post pain: adequate analgesia    Post assessment: no apparent anesthetic complications    Post vital signs: stable    Level of consciousness: sedated    Nausea/Vomiting: no nausea/vomiting    Complications: none    Transfer of care protocol was followed      Last vitals:   Visit Vitals  BP (!) 145/69 (BP Location: Left arm, Patient Position: Sitting)   Pulse 87   Temp 36.8 °C (98.3 °F) (Temporal)   Resp 16   Ht 5' 7" (1.702 m)   Wt 77.1 kg (170 lb)   SpO2 97%   BMI 26.63 kg/m²     "

## 2023-07-19 NOTE — ANESTHESIA PREPROCEDURE EVALUATION
Pre-operative evaluation for Procedure(s) (LRB):  TURP, USING LASER (N/A)  CYSTOSCOPY (N/A)    Pee Alba is a 78 y.o. male with pmh of HTN who presents with BPH. Plan for the above procedure.     Patient Active Problem List   Diagnosis    Prostatitis    Benign prostatic hyperplasia with urinary obstruction    Nocturia    Frequency    ED (erectile dysfunction) of organic origin    Elevated PSA    OAB (overactive bladder)        No current facility-administered medications on file prior to encounter.     Current Outpatient Medications on File Prior to Encounter   Medication Sig Dispense Refill    amLODIPine (NORVASC) 5 MG tablet Take 1 tablet by mouth once daily.      atorvastatin (LIPITOR) 20 MG tablet Take 1 tablet by mouth once daily.      carvedilol (COREG) 12.5 MG tablet Take 12.5 mg by mouth 2 (two) times daily with meals.      omeprazole (PRILOSEC) 20 MG capsule Take 20 mg by mouth once daily.      tamsulosin (FLOMAX) 0.4 mg Cap Take 1 capsule (0.4 mg total) by mouth every evening. 30 capsule 11    torsemide (DEMADEX) 5 MG Tab Take 20 mg by mouth once daily.      valsartan (DIOVAN) 160 MG tablet Take 1 tablet by mouth once daily.      sildenafiL (VIAGRA) 100 MG tablet TAKE 1 TABLET (100 MG TOTAL) BY MOUTH DAILY AS NEEDED. 30 tablet 3    tadalafiL (CIALIS) 5 MG tablet Take 1 tablet (5 mg total) by mouth daily as needed for Erectile Dysfunction. 30 tablet 11       Past Surgical History:   Procedure Laterality Date    ABDOMINAL SURGERY           Pre-op Assessment    I have reviewed the Patient Summary Reports.     I have reviewed the Nursing Notes. I have reviewed the NPO Status.   I have reviewed the Medications.     Review of Systems  Anesthesia Hx:  System negative unless otherwise specified in the HPI or problem list above History of prior surgery of interest to airway management or planning: Denies Family  Hx of Anesthesia complications.   Denies Personal Hx of Anesthesia complications.   Hematology/Oncology:        Hematology Comments: System negative unless otherwise specified in the HPI or problem list above Oncology Comments: System negative unless otherwise specified in the HPI or problem list above    EENT/Dental:   System negative unless otherwise specified in the HPI or problem list above   Cardiovascular:   System negative unless otherwise specified in the HPI or problem list above   Pulmonary:   System negative unless otherwise specified in the HPI or problem list above   Renal/:   System negative unless otherwise specified in the HPI or problem list above   Hepatic/GI:   System negative unless otherwise specified in the HPI or problem list above   Musculoskeletal:   System negative unless otherwise specified in the HPI or problem list above   OB/GYN/PEDS:  System negative unless otherwise specified in the HPI or problem list above   Neurological:   System negative unless otherwise specified in the HPI or problem list above   Endocrine:   System negative unless otherwise specified in the HPI or problem list above   Dermatological:   System negative unless otherwise specified in the HPI or problem list above   Psych:   System negative unless otherwise specified in the HPI or problem list above         Physical Exam  General: Well nourished, Cooperative, Alert and Oriented    Airway:  Mouth Opening: Normal  TM Distance: Normal  Tongue: Normal  Neck ROM: Normal ROM    Chest/Lungs:  Clear to auscultation, Normal Respiratory Rate    Heart:  Rate: Normal  Rhythm: Regular Rhythm  Sounds: Normal        Anesthesia Plan  Type of Anesthesia, risks & benefits discussed:    Anesthesia Type: MAC, Gen Natural Airway, Gen ETT  Intra-op Monitoring Plan: Standard ASA Monitors  Post Op Pain Control Plan: multimodal analgesia  Induction:  IV  Informed Consent: Informed consent signed with the Patient and all parties  understand the risks and agree with anesthesia plan.  All questions answered.   ASA Score: 3  Day of Surgery Review of History & Physical: H&P Update referred to the surgeon/provider.    Ready For Surgery From Anesthesia Perspective.     .

## 2023-07-19 NOTE — H&P (VIEW-ONLY)
CC: ED and LUTS, nocturia    Pee Alba is a 78 y.o. man who is here for the evaluation of LUTS and ED  A new pt referred by his PCP, Dae Burnett MD   Hx of ED and Benign Prostatic Hypertrophy with prostatitis  a [place] pt seen by me before for prostatitis before with elevated PSA.   He is doing well.   Stopped rapaflo because he felt his urination is back to normal.     c/o ED  Tried pills and found that he did not respond to the pills.  Went to a commercial ED clinic and tried PEP injection.  Disappointed to their service and its cost.  So he tried PEP injection with Karo Roque.  However, he reports that the outcome was as good as before.  So he no longer uses a pep injection.     Positive smokingHx of ED and Benign Prostatic Hypertrophy with prostatitis  a [place] pt seen by me before for prostatitis before with elevated PSA.   He is doing well.   Stopped rapaflo because he felt his urination is back to normal.     c/o ED  Tried pills and found that he did not respond to the pills.  Went to a commercial ED clinic and tried PEP injection.  Disappointed to their service and its cost.  So he tried PEP injection with Karo Roque.  However, he reports that the outcome was as good as before.  So he no longer uses a pep injection.     Positive smoking    Last seen by me in 12/14/20.  Was scheduled for Rezum Therapy in 1/2021 but it did not happen.  He would like to come and see me.    Procedure Date:  12/03/2020  Procedure:  Male Diagnostic Cystourethroscopy  Pre-op diagnosis: BPH with obstruction  Post-op diagnosis: same  Anesthesia: Local  Surgeon:  James Aponte MD  Findings:  Urethra:  Normal urethra.   Sphincter: competent.  Prostate: Estimated Length Prostatic Urethra: 3.5 cm with moderate obstruction  Bladder neck: patent with no stricture  Bladder:  Normal bladder.   Normal ureteral orifices bilaterally.   Moderate trabeculation.    Conclusion:  1. BPH with obstruction  Responded well to flomax so far but  would like to get even better.  I think that he will be a good candidate for Rezum Therapy.  Will plan it in January  Plan:  Follow up:  Rezum Therapy    Pt never followed up for Rezum Therapy.    C/o frequency, urgency, weak urine flow, incomplete bladder emptying, nocturia 2 to 3 x.    Past Medical History:   Diagnosis Date    Aortic aneurysm     Arrhythmia     Hypertension      Past Surgical History:   Procedure Laterality Date    ABDOMINAL SURGERY       Social History     Tobacco Use    Smoking status: Every Day     Packs/day: 0.50     Types: Cigarettes    Smokeless tobacco: Never   Substance Use Topics    Alcohol use: No     History reviewed. No pertinent family history.  Allergy:  Review of patient's allergies indicates:   Allergen Reactions    Iodinated contrast media Anaphylaxis    Iodine      Outpatient Encounter Medications as of 5/30/2023   Medication Sig Dispense Refill    amlodipine-valsartan (EXFORGE)  mg per tablet Take 1 tablet by mouth once daily.      atorvastatin (LIPITOR) 20 MG tablet Take 1 tablet by mouth once daily.      carvedilol (COREG) 12.5 MG tablet Take 12.5 mg by mouth 2 (two) times daily with meals.      omeprazole (PRILOSEC) 20 MG capsule Take 20 mg by mouth once daily.      tamsulosin (FLOMAX) 0.4 mg Cap Take 1 capsule (0.4 mg total) by mouth every evening. 30 capsule 11    torsemide (DEMADEX) 5 MG Tab Take 20 mg by mouth once daily.      valsartan (DIOVAN) 160 MG tablet Take 1 tablet by mouth once daily.      amLODIPine (NORVASC) 5 MG tablet Take 1 tablet by mouth once daily.      amLODIPine-valsartan-hcthiazid -12.5 mg Tab Take 1 tablet by mouth once daily.      sildenafiL (VIAGRA) 100 MG tablet TAKE 1 TABLET (100 MG TOTAL) BY MOUTH DAILY AS NEEDED. 30 tablet 3    tadalafiL (CIALIS) 5 MG tablet Take 1 tablet (5 mg total) by mouth daily as needed for Erectile Dysfunction. 30 tablet 11     No facility-administered encounter medications on file as of 5/30/2023.     Review  of Systems   ROS  Physical Exam     Vitals:    05/30/23 1102   BP: 131/68   Pulse: (!) 55     Physical Exam  Constitutional:       General: He is not in acute distress.     Appearance: He is well-developed. He is not diaphoretic.   HENT:      Head: Normocephalic and atraumatic.      Right Ear: External ear normal.      Left Ear: External ear normal.      Nose: Nose normal.   Eyes:      Conjunctiva/sclera: Conjunctivae normal.      Pupils: Pupils are equal, round, and reactive to light.   Neck:      Thyroid: No thyromegaly.      Vascular: No JVD.      Trachea: No tracheal deviation.   Cardiovascular:      Rate and Rhythm: Normal rate and regular rhythm.      Heart sounds: Normal heart sounds. No murmur heard.    No friction rub. No gallop.   Pulmonary:      Effort: Pulmonary effort is normal. No respiratory distress.      Breath sounds: Normal breath sounds. No wheezing.   Chest:      Chest wall: No tenderness.   Abdominal:      General: Bowel sounds are normal. There is no distension.      Palpations: Abdomen is soft. There is no mass.      Tenderness: There is no abdominal tenderness. There is no guarding or rebound.   Genitourinary:     Penis: Normal. No tenderness.       Rectum: Normal.      Comments: Prostate 25 grams with negative nodule or negative tenderness    Musculoskeletal:         General: No tenderness or deformity. Normal range of motion.      Cervical back: Normal range of motion and neck supple.   Lymphadenopathy:      Cervical: No cervical adenopathy.   Skin:     General: Skin is warm and dry.   Neurological:      Mental Status: He is alert and oriented to person, place, and time.   Psychiatric:         Behavior: Behavior normal.         Thought Content: Thought content normal.       LABS:  Lab Results   Component Value Date    PSADIAG 2.5 05/25/2023    PSADIAG 2.2 10/27/2020    PSADIAG 2.5 06/25/2019    PSADIAG 2.2 01/24/2017    PSADIAG 3.3 01/12/2016    PSADIAG 3.1 11/18/2014    PSADIAG 29.9 (H)  03/13/2014     Results for orders placed or performed in visit on 05/25/23   Prostate Specific Antigen, Diagnostic   Result Value Ref Range    PSA Diagnostic 2.5 0.00 - 4.00 ng/mL   Results for orders placed or performed in visit on 10/27/20   Prostate Specific Antigen, Diagnostic   Result Value Ref Range    PSA Diagnostic 2.2 0.00 - 4.00 ng/mL   Results for orders placed or performed in visit on 06/25/19   Prostate Specific Antigen, Diagnostic   Result Value Ref Range    PSA Diagnostic 2.5 0.00 - 4.00 ng/mL     Lab Results   Component Value Date    CREATININE 0.9 06/25/2019    CREATININE 1.2 03/14/2014    CREATININE 1.2 03/13/2014     Results for orders placed or performed in visit on 06/25/19   Testosterone   Result Value Ref Range    Testosterone, Total 445 304 - 1227 ng/dL     Urine Culture, Routine   Date Value Ref Range Status   03/13/2014 No significant growth  Final     No results found for: HGBA1C    Radiology:    Assessment and Plan:  Pee was seen today for follow-up.    Diagnoses and all orders for this visit:    Benign prostatic hyperplasia with urinary obstruction  -     Simple Urodynamics w/ Cysto; Future    OAB (overactive bladder)  -     Simple Urodynamics w/ Cysto; Future  -     Urine Culture High Risk; Standing  -     POCT Urinalysis; Standing  -     Urine Culture High Risk    Nocturia  -     Simple Urodynamics w/ Cysto; Future    Other orders  -     LIDOcaine HCl 2% urojet  -     doxycycline tablet 100 mg      Patient was assessed preoperatively, found to be appropriate in behavior. The risks, benefits, and alternatives to procedures were discussed, and questions were answered. Plan to proceed with described procedure.    Urine negative for all tested components.

## 2023-07-19 NOTE — ANESTHESIA PROCEDURE NOTES
Intubation    Date/Time: 7/19/2023 10:19 AM  Performed by: Mi Bryant CRNA  Authorized by: Dajuan Chávez MD     Intubation:     Induction:  Intravenous    Intubated:  Postinduction    Mask Ventilation:  Easy mask    Attempts:  1    Attempted By:  CRNA    Difficult Airway Encountered?: No      Complications:  None    Airway Device:  Supraglottic airway/LMA    Airway Device Size:  4.5    Style/Cuff Inflation:  Cuffed (inflated to minimal occlusive pressure)    Placement Verified By:  Capnometry    Complicating Factors:  None    Findings Post-Intubation:  BS equal bilateral and atraumatic/condition of teeth unchanged

## 2023-07-19 NOTE — PATIENT INSTRUCTIONS
Post Cystoscopy and Transurethral resection of Prostate Instructions  Do not strain to have a bowel movement  No strenuous exercise x 7 days  No driving while you are on narcotic pain medications or if your lee  catheter is in place    You can expect:  To see blood in your urine.    Go to the ER if:  Your catheter stops draining  You are having severe abdominal pain  Inability to void if you do not have a catheter    Call the doctor if:  Temperature is greater than 101F  Persistent vomiting and inability to keep food down

## 2023-07-20 ENCOUNTER — TELEPHONE (OUTPATIENT)
Dept: UROLOGY | Facility: CLINIC | Age: 78
End: 2023-07-20
Payer: MEDICARE

## 2023-07-20 NOTE — TELEPHONE ENCOUNTER
----- Message from Love Glynn LPN sent at 7/19/2023  1:48 PM CDT -----  Regarding: FW: Pt advice  Contact: [phone]    ----- Message -----  From: Kyle Rocha  Sent: 7/19/2023  11:37 AM CDT  To: [facility]ARTINE[facility] Staff  Subject: Pt advice                                        Pt is calling stating he need to rs procedure 7/19 and would liek to speak directly to the provider. Please call

## 2023-07-20 NOTE — ANESTHESIA POSTPROCEDURE EVALUATION
Anesthesia Post Evaluation    Patient: Pee Alba    Procedure(s) Performed: * No procedures listed *    Final Anesthesia Type: general      Patient location during evaluation: PACU  Patient participation: Yes- Able to Participate  Level of consciousness: awake and alert  Post-procedure vital signs: reviewed and stable  Pain management: adequate  Airway patency: patent    PONV status at discharge: No PONV  Anesthetic complications: no      Cardiovascular status: blood pressure returned to baseline  Respiratory status: unassisted  Hydration status: euvolemic  Follow-up not needed.          Vitals Value Taken Time   /67 07/19/23 1115   Temp 36.7 °C (98.1 °F) 07/19/23 1115   Pulse 60 07/19/23 1115   Resp 18 07/19/23 1115   SpO2 100 % 07/19/23 1115         No case tracking events are documented in the log.      Pain/Todd Score: Todd Score: 10 (7/19/2023 11:00 AM)

## 2023-08-01 ENCOUNTER — TELEPHONE (OUTPATIENT)
Dept: UROLOGY | Facility: CLINIC | Age: 78
End: 2023-08-01
Payer: MEDICARE

## 2023-08-01 ENCOUNTER — ANESTHESIA EVENT (OUTPATIENT)
Dept: SURGERY | Facility: HOSPITAL | Age: 78
End: 2023-08-01
Payer: MEDICARE

## 2023-08-01 NOTE — TELEPHONE ENCOUNTER
Called pt to confirm arrival time of 8:15 am for procedure on 8/2/23. Gave pt NPO instructions and gave pt opportunity to ask questions. Pt verbalized understanding.

## 2023-08-02 ENCOUNTER — HOSPITAL ENCOUNTER (OUTPATIENT)
Facility: HOSPITAL | Age: 78
Discharge: HOME | End: 2023-08-02
Attending: UROLOGY | Admitting: UROLOGY
Payer: MEDICARE

## 2023-08-02 ENCOUNTER — ANESTHESIA (OUTPATIENT)
Dept: SURGERY | Facility: HOSPITAL | Age: 78
End: 2023-08-02
Payer: MEDICARE

## 2023-08-02 VITALS
OXYGEN SATURATION: 97 % | HEIGHT: 67 IN | TEMPERATURE: 98 F | HEART RATE: 52 BPM | DIASTOLIC BLOOD PRESSURE: 98 MMHG | RESPIRATION RATE: 20 BRPM | SYSTOLIC BLOOD PRESSURE: 126 MMHG | BODY MASS INDEX: 26.63 KG/M2

## 2023-08-02 DIAGNOSIS — N40.1 BPH WITH OBSTRUCTION/LOWER URINARY TRACT SYMPTOMS: ICD-10-CM

## 2023-08-02 DIAGNOSIS — N13.8 BPH WITH OBSTRUCTION/LOWER URINARY TRACT SYMPTOMS: ICD-10-CM

## 2023-08-02 PROCEDURE — 63600175 PHARM REV CODE 636 W HCPCS: Performed by: ANESTHESIOLOGY

## 2023-08-02 PROCEDURE — 25000003 PHARM REV CODE 250: Performed by: NURSE ANESTHETIST, CERTIFIED REGISTERED

## 2023-08-02 PROCEDURE — 25000003 PHARM REV CODE 250: Performed by: UROLOGY

## 2023-08-02 PROCEDURE — 52648 PR LASER VAPORIZATION SURGERY PROSTATE, COMPLETE: ICD-10-PCS | Mod: ,,, | Performed by: UROLOGY

## 2023-08-02 PROCEDURE — D9220A PRA ANESTHESIA: Mod: CRNA,,, | Performed by: NURSE ANESTHETIST, CERTIFIED REGISTERED

## 2023-08-02 PROCEDURE — 37000009 HC ANESTHESIA EA ADD 15 MINS: Performed by: UROLOGY

## 2023-08-02 PROCEDURE — 52648 LASER SURGERY OF PROSTATE: CPT | Mod: ,,, | Performed by: UROLOGY

## 2023-08-02 PROCEDURE — D9220A PRA ANESTHESIA: ICD-10-PCS | Mod: CRNA,,, | Performed by: NURSE ANESTHETIST, CERTIFIED REGISTERED

## 2023-08-02 PROCEDURE — 71000044 HC DOSC ROUTINE RECOVERY FIRST HOUR: Performed by: UROLOGY

## 2023-08-02 PROCEDURE — 71000016 HC POSTOP RECOV ADDL HR: Performed by: UROLOGY

## 2023-08-02 PROCEDURE — 27200651 HC AIRWAY, LMA: Performed by: ANESTHESIOLOGY

## 2023-08-02 PROCEDURE — 63600175 PHARM REV CODE 636 W HCPCS: Performed by: NURSE ANESTHETIST, CERTIFIED REGISTERED

## 2023-08-02 PROCEDURE — D9220A PRA ANESTHESIA: Mod: ANES,,, | Performed by: ANESTHESIOLOGY

## 2023-08-02 PROCEDURE — 36000706: Performed by: UROLOGY

## 2023-08-02 PROCEDURE — 37000008 HC ANESTHESIA 1ST 15 MINUTES: Performed by: UROLOGY

## 2023-08-02 PROCEDURE — 71000015 HC POSTOP RECOV 1ST HR: Performed by: UROLOGY

## 2023-08-02 PROCEDURE — 36000707: Performed by: UROLOGY

## 2023-08-02 PROCEDURE — D9220A PRA ANESTHESIA: ICD-10-PCS | Mod: ANES,,, | Performed by: ANESTHESIOLOGY

## 2023-08-02 PROCEDURE — 25000003 PHARM REV CODE 250

## 2023-08-02 PROCEDURE — 25000003 PHARM REV CODE 250: Performed by: STUDENT IN AN ORGANIZED HEALTH CARE EDUCATION/TRAINING PROGRAM

## 2023-08-02 PROCEDURE — 63600175 PHARM REV CODE 636 W HCPCS: Performed by: STUDENT IN AN ORGANIZED HEALTH CARE EDUCATION/TRAINING PROGRAM

## 2023-08-02 RX ORDER — PHENAZOPYRIDINE HYDROCHLORIDE 100 MG/1
TABLET, FILM COATED ORAL
Status: COMPLETED
Start: 2023-08-02 | End: 2023-08-02

## 2023-08-02 RX ORDER — OXYBUTYNIN CHLORIDE 5 MG/1
5 TABLET ORAL 3 TIMES DAILY
Status: DISCONTINUED | OUTPATIENT
Start: 2023-08-02 | End: 2023-08-02 | Stop reason: HOSPADM

## 2023-08-02 RX ORDER — DROPERIDOL 2.5 MG/ML
0.62 INJECTION, SOLUTION INTRAMUSCULAR; INTRAVENOUS ONCE AS NEEDED
Status: DISCONTINUED | OUTPATIENT
Start: 2023-08-02 | End: 2023-08-02 | Stop reason: HOSPADM

## 2023-08-02 RX ORDER — HYDROMORPHONE HYDROCHLORIDE 1 MG/ML
0.2 INJECTION, SOLUTION INTRAMUSCULAR; INTRAVENOUS; SUBCUTANEOUS EVERY 5 MIN PRN
Status: DISCONTINUED | OUTPATIENT
Start: 2023-08-02 | End: 2023-08-02 | Stop reason: HOSPADM

## 2023-08-02 RX ORDER — PHENAZOPYRIDINE HYDROCHLORIDE 100 MG/1
100 TABLET, FILM COATED ORAL
Status: DISCONTINUED | OUTPATIENT
Start: 2023-08-02 | End: 2023-08-02 | Stop reason: HOSPADM

## 2023-08-02 RX ORDER — LIDOCAINE HYDROCHLORIDE 20 MG/ML
INJECTION INTRAVENOUS
Status: DISCONTINUED | OUTPATIENT
Start: 2023-08-02 | End: 2023-08-02

## 2023-08-02 RX ORDER — PROPOFOL 10 MG/ML
VIAL (ML) INTRAVENOUS
Status: DISCONTINUED | OUTPATIENT
Start: 2023-08-02 | End: 2023-08-02

## 2023-08-02 RX ORDER — OXYBUTYNIN CHLORIDE 5 MG/1
TABLET ORAL
Status: COMPLETED
Start: 2023-08-02 | End: 2023-08-02

## 2023-08-02 RX ORDER — PHENAZOPYRIDINE HYDROCHLORIDE 200 MG/1
200 TABLET, FILM COATED ORAL 3 TIMES DAILY PRN
Qty: 21 TABLET | Refills: 0 | Status: SHIPPED | OUTPATIENT
Start: 2023-08-02 | End: 2023-08-09

## 2023-08-02 RX ORDER — FENTANYL CITRATE 50 UG/ML
INJECTION, SOLUTION INTRAMUSCULAR; INTRAVENOUS
Status: DISCONTINUED | OUTPATIENT
Start: 2023-08-02 | End: 2023-08-02

## 2023-08-02 RX ORDER — ONDANSETRON 2 MG/ML
4 INJECTION INTRAMUSCULAR; INTRAVENOUS ONCE AS NEEDED
Status: DISCONTINUED | OUTPATIENT
Start: 2023-08-02 | End: 2023-08-02 | Stop reason: HOSPADM

## 2023-08-02 RX ORDER — ONDANSETRON 2 MG/ML
INJECTION INTRAMUSCULAR; INTRAVENOUS
Status: DISCONTINUED | OUTPATIENT
Start: 2023-08-02 | End: 2023-08-02

## 2023-08-02 RX ORDER — SULFAMETHOXAZOLE AND TRIMETHOPRIM 800; 160 MG/1; MG/1
1 TABLET ORAL 2 TIMES DAILY
Qty: 14 TABLET | Refills: 0 | Status: SHIPPED | OUTPATIENT
Start: 2023-08-02 | End: 2023-08-09

## 2023-08-02 RX ORDER — HYDROXYZINE PAMOATE 50 MG/1
50 CAPSULE ORAL ONCE
Status: COMPLETED | OUTPATIENT
Start: 2023-08-02 | End: 2023-08-02

## 2023-08-02 RX ORDER — OXYBUTYNIN CHLORIDE 10 MG/1
10 TABLET, EXTENDED RELEASE ORAL DAILY
Qty: 30 TABLET | Refills: 5 | Status: SHIPPED | OUTPATIENT
Start: 2023-08-02 | End: 2024-01-29

## 2023-08-02 RX ORDER — OXYCODONE AND ACETAMINOPHEN 5; 325 MG/1; MG/1
TABLET ORAL
Status: COMPLETED
Start: 2023-08-02 | End: 2023-08-02

## 2023-08-02 RX ORDER — OXYCODONE AND ACETAMINOPHEN 5; 325 MG/1; MG/1
1 TABLET ORAL EVERY 4 HOURS PRN
Qty: 15 TABLET | Refills: 0 | Status: SHIPPED | OUTPATIENT
Start: 2023-08-02

## 2023-08-02 RX ADMIN — HYDROMORPHONE HYDROCHLORIDE 0.2 MG: 1 INJECTION, SOLUTION INTRAMUSCULAR; INTRAVENOUS; SUBCUTANEOUS at 11:08

## 2023-08-02 RX ADMIN — OXYBUTYNIN CHLORIDE 5 MG: 5 TABLET ORAL at 11:08

## 2023-08-02 RX ADMIN — FENTANYL CITRATE 25 MCG: 50 INJECTION, SOLUTION INTRAMUSCULAR; INTRAVENOUS at 09:08

## 2023-08-02 RX ADMIN — HYDROXYZINE PAMOATE 50 MG: 50 CAPSULE ORAL at 12:08

## 2023-08-02 RX ADMIN — PROPOFOL 200 MG: 10 INJECTION, EMULSION INTRAVENOUS at 09:08

## 2023-08-02 RX ADMIN — ONDANSETRON 4 MG: 2 INJECTION INTRAMUSCULAR; INTRAVENOUS at 10:08

## 2023-08-02 RX ADMIN — OXYCODONE HYDROCHLORIDE AND ACETAMINOPHEN 1 TABLET: 5; 325 TABLET ORAL at 01:08

## 2023-08-02 RX ADMIN — GLYCOPYRROLATE 0.2 MG: 0.2 INJECTION, SOLUTION INTRAMUSCULAR; INTRAVENOUS at 09:08

## 2023-08-02 RX ADMIN — PHENAZOPYRIDINE HYDROCHLORIDE 100 MG: 100 TABLET ORAL at 11:08

## 2023-08-02 RX ADMIN — PHENAZOPYRIDINE HYDROCHLORIDE 100 MG: 100 TABLET, FILM COATED ORAL at 11:08

## 2023-08-02 RX ADMIN — FENTANYL CITRATE 75 MCG: 50 INJECTION, SOLUTION INTRAMUSCULAR; INTRAVENOUS at 10:08

## 2023-08-02 RX ADMIN — LIDOCAINE HYDROCHLORIDE 100 MG: 20 INJECTION INTRAVENOUS at 09:08

## 2023-08-02 RX ADMIN — SODIUM CHLORIDE: 0.9 INJECTION, SOLUTION INTRAVENOUS at 09:08

## 2023-08-02 RX ADMIN — CEFAZOLIN 2 G: 2 INJECTION, POWDER, FOR SOLUTION INTRAMUSCULAR; INTRAVENOUS at 09:08

## 2023-08-02 NOTE — OP NOTE
[facility]  Operative Note    Date: 08/02/2023    Pre-Op Diagnosis: BPH    Post-Op Diagnosis: same    Procedure(s) Performed:   1.  Laser enucleation / vaporization of the prostate    Specimen(s): none    Staff Surgeon: James Aponte MD    Assistant Surgeon: Samy Diego MD; Sparkle Salas MD    Anesthesia: General LMA anesthesia    Indications: Pee Alba is a 78 y.o. male with BPH    Findings:   -Bladder neck opened to the verumontanum. Open prostatic urethra with hemostasis controlled    Estimated Blood Loss: min    Drains: 20 Fr lee catheter (10cc)    Procedure in Detail:  After risks, benefits and possible complications of Laser TURP were discussed, the patient elected to undergo the procedure and informed consent was obtained. All questions were answered in the lindsey-operative area. The patient was transferred to the cystoscopy suite and placed on the fluoroscopy table in the supine position. Anesthesia was administered.  When the patient was adequately sedated he was placed in the dorsal lithotomy position and prepped and draped in the usual sterile fashion.  Time out was performed, lindsey-procedural antibiotics were confirmed.      A 22 Peruvian revolix laser scope was introduced into the bladder per urethra. Bilobar hyperplasia and prominence at the 12 oclock position was seen. Formal cystoscopy was performed which revealed no tumors or lesions suspicious for malignancy, no bladder stones, no bladder diverticuli, and minor trabeculations.  The right and left ureteral orifices were visualized in the normal anatomic position and clear efflux of urine seen bilaterally.     Our attention was first turned to enucleating the median lobe of the prostate. This was accomplished with a 800 micro Quanta laser fiber set at  bruce. An incision in the prostate was made with the laser fiber at the bladder neck at the 5 o'clock position and brought just proximal to the verumontanum to the depth of the prostatic  capsule. A right counter incision was made in the prostate at the 7 o'clock position and brought to just proximal of the verumontanum. The median lobe was then taken down in a systematic fashion    Next lateral incisions were made from the 2 o'clock position and brought down to the proximal verumontanum to the level of the prostatic capsule. The lateral lobe was then vaporized superficially to deep in a stepwise fashion. This was repeated from the 10' o'clock position to just proximal to the veru. After all hyperplasia had been vaporized the bladder was drained. A good channel was seen. All bleeding was coagulated with the quanta laser and adequate hemostasis was obtained.      The patient tolerated the procedure well and was transferred to the recovery room in stable condition.      Follow up care:  The patient will follow up with Dr. Aponte on Friday for a voiding trial.  He was given prescriptions for oxybutynin, pyridium, and bactrim.     Samy Diego MD    I present in the OR with the resident for the time of the surgery.

## 2023-08-02 NOTE — ANESTHESIA PROCEDURE NOTES
Subglottic airway    Date/Time: 8/2/2023 9:52 AM    Performed by: Adams Cortez CRNA  Authorized by: Markos Mccormack MD    Intubation:     Induction:  Intravenous    Mask Ventilation:  Easy mask    Attempts:  1    Attempted By:  Student    Difficult Airway Encountered?: No      Complications:  None    Airway Device:  Supraglottic airway/LMA    Airway Device Size:  4.5    Style/Cuff Inflation:  Cuffed (inflated to minimal occlusive pressure)    Placement Verified By:  Capnometry    Complicating Factors:  None    Findings Post-Intubation:  BS equal bilateral

## 2023-08-02 NOTE — TRANSFER OF CARE
"Anesthesia Transfer of Care Note    Patient: Pee Alba    Procedure(s) Performed: Procedure(s) (LRB):  TURP, USING LASER (N/A)  CYSTOSCOPY (N/A)    Patient location: PACU    Anesthesia Type: general    Transport from OR: Transported from OR on 6-10 L/min O2 by face mask with adequate spontaneous ventilation    Post pain: adequate analgesia    Post assessment: no apparent anesthetic complications    Post vital signs: stable    Level of consciousness: awake and alert    Nausea/Vomiting: no nausea/vomiting    Complications: none    Transfer of care protocol was followed      Last vitals:   Visit Vitals  BP (!) 146/73 (BP Location: Right arm, Patient Position: Lying)   Pulse 64   Temp 36.7 °C (98 °F) (Temporal)   Resp 18   Ht 5' 7" (1.702 m)   SpO2 99%   BMI 26.63 kg/m²     "

## 2023-08-02 NOTE — PATIENT INSTRUCTIONS
Post Cystoscopy and Transurethral resection of Prostate  Lee catheter out Friday in clinic    Do not strain to have a bowel movement  No strenuous exercise x 7 days  No driving while you are on narcotic pain medications or if your lee  catheter is in place    You can expect:  To see blood in your urine.    Go to the ER if:  You are having severe abdominal pain  Inability to urinate if you do not have a catheter  Catheter stops draining if you have one in place.    Call the doctor if:  Temperature is greater than 101F  Persistent vomiting and inability to keep food down

## 2023-08-02 NOTE — BRIEF OP NOTE
[facility] (1st Fl)  Brief Operative Note    Surgery Date: 8/2/2023     Surgeon(s) and Role:     * James Aponte MD - Primary     * Samy Diego MD - Resident - Assisting     * Sparkle Salas MD - Resident - Assisting        Pre-op Diagnosis:  BPH with urinary obstruction [N40.1, N13.8]  OAB (overactive bladder) [N32.81]    Post-op Diagnosis:  Post-Op Diagnosis Codes:     * BPH with urinary obstruction [N40.1, N13.8]     * OAB (overactive bladder) [N32.81]    Procedure(s) (LRB):  TURP, USING LASER (N/A)  CYSTOSCOPY (N/A)    Anesthesia: General    Operative Findings:   -Bladder neck opened to the verumontanum. Open prostatic urethra with hemostasis controlled    Estimated Blood Loss: * No values recorded between 8/2/2023 10:01 AM and 8/2/2023 10:34 AM *         Specimens:   Specimen (24h ago, onward)      None              Discharge Note    OUTCOME: Patient tolerated treatment/procedure well without complication and is now ready for discharge.    DISPOSITION: Home or Self Care    FINAL DIAGNOSIS:  BPH    FOLLOWUP: In clinic    DISCHARGE INSTRUCTIONS:  No discharge procedures on file.

## 2023-08-02 NOTE — INTERVAL H&P NOTE
The patient has been examined and the H&P has been reviewed:    I concur with the findings and no changes have occurred since H&P was written.    Surgery risks, benefits and alternative options discussed and understood by patient/family.    Urine dipstick - negative for all components.    There are no hospital problems to display for this patient.

## 2023-08-02 NOTE — ANESTHESIA PREPROCEDURE EVALUATION
08/02/2023  Pee Alba is a 78 y.o., male.      Pre-op Assessment          Review of Systems  Anesthesia Hx:  No problems with previous Anesthesia    Social:  Smoker    Cardiovascular:   Hypertension Denies CAD.            Aortic aneurysm     Functional Capacity Can you climb two flights of stairs? ==> Yes    Pulmonary:   Denies Asthma.  Denies Sleep Apnea.    Renal/:   Denies Chronic Renal Disease.     Hepatic/GI:   Denies PUD. Denies GERD. Denies Liver Disease.    Neurological:   Denies CVA. Denies Seizures.    Endocrine:   Denies Diabetes. Denies Hypothyroidism.        Physical Exam  General: Alert    Airway:  Mallampati: II / II  Mouth Opening: Normal  TM Distance: Normal  Tongue: Normal  Neck ROM: Normal ROM    Dental:  Caps / Implants        Anesthesia Plan  Type of Anesthesia, risks & benefits discussed:    Anesthesia Type: Gen ETT  Intra-op Monitoring Plan: Standard ASA Monitors  Post Op Pain Control Plan: multimodal analgesia and IV/PO Opioids PRN  Induction:  IV  Airway Plan: Direct  Informed Consent: Informed consent signed with the Patient and all parties understand the risks and agree with anesthesia plan.  All questions answered.   ASA Score: 2    Ready For Surgery From Anesthesia Perspective.     .

## 2023-08-03 NOTE — ANESTHESIA POSTPROCEDURE EVALUATION
Anesthesia Post Evaluation    Patient: Pee Alba    Procedure(s) Performed: Procedure(s) (LRB):  TURP, USING LASER (N/A)  CYSTOSCOPY (N/A)    Final Anesthesia Type: general      Patient location during evaluation: PACU  Patient participation: Yes- Able to Participate  Level of consciousness: awake  Post-procedure vital signs: reviewed and stable  Pain management: adequate  Airway patency: patent    PONV status at discharge: No PONV  Anesthetic complications: no      Cardiovascular status: blood pressure returned to baseline  Respiratory status: unassisted  Hydration status: euvolemic  Follow-up not needed.          Vitals Value Taken Time   /98 08/02/23 1345   Temp 36.6 °C (97.9 °F) 08/02/23 1345   Pulse 52 08/02/23 1345   Resp 20 08/02/23 1345   SpO2 97 % 08/02/23 1345         No case tracking events are documented in the log.      Pain/Todd Score: Pain Rating Prior to Med Admin: 4 (8/2/2023  1:10 PM)  Todd Score: 9 (8/2/2023 11:33 AM)

## 2023-08-04 ENCOUNTER — PATIENT MESSAGE (OUTPATIENT)
Dept: UROLOGY | Facility: CLINIC | Age: 78
End: 2023-08-04

## 2023-08-04 ENCOUNTER — OFFICE VISIT (OUTPATIENT)
Dept: UROLOGY | Facility: CLINIC | Age: 78
End: 2023-08-04
Payer: MEDICARE

## 2023-08-04 VITALS
DIASTOLIC BLOOD PRESSURE: 66 MMHG | HEIGHT: 67 IN | HEART RATE: 59 BPM | BODY MASS INDEX: 25.96 KG/M2 | WEIGHT: 165.38 LBS | SYSTOLIC BLOOD PRESSURE: 110 MMHG

## 2023-08-04 DIAGNOSIS — N40.1 BENIGN PROSTATIC HYPERPLASIA WITH URINARY OBSTRUCTION: ICD-10-CM

## 2023-08-04 DIAGNOSIS — N32.81 OAB (OVERACTIVE BLADDER): Primary | ICD-10-CM

## 2023-08-04 DIAGNOSIS — N13.8 BENIGN PROSTATIC HYPERPLASIA WITH URINARY OBSTRUCTION: ICD-10-CM

## 2023-08-04 PROCEDURE — 99999 PR PBB SHADOW E&M-EST. PATIENT-LVL III: ICD-10-PCS | Mod: PBBFAC,,,

## 2023-08-04 PROCEDURE — 99213 OFFICE O/P EST LOW 20 MIN: CPT | Mod: PBBFAC

## 2023-08-04 PROCEDURE — 99024 POSTOP FOLLOW-UP VISIT: CPT | Mod: POP,,,

## 2023-08-04 PROCEDURE — 99999 PR PBB SHADOW E&M-EST. PATIENT-LVL III: CPT | Mod: PBBFAC,,,

## 2023-08-04 PROCEDURE — 99024 PR POST-OP FOLLOW-UP VISIT: ICD-10-PCS | Mod: POP,,,

## 2023-08-04 RX ORDER — HYDROCHLOROTHIAZIDE 12.5 MG/1
1 TABLET ORAL DAILY
COMMUNITY
Start: 2023-07-12

## 2023-08-04 RX ORDER — HYOSCYAMINE SULFATE 0.125 MG
125 TABLET ORAL EVERY 6 HOURS PRN
Qty: 40 TABLET | Refills: 1 | Status: SHIPPED | OUTPATIENT
Start: 2023-08-04 | End: 2023-08-14

## 2023-08-04 NOTE — PROGRESS NOTES
CHIEF COMPLAINT:  VT    HISTORY OF PRESENTING ILLINESS:  Pee Alba is a 78 y.o. man who presents today for a post op TURP VT. TURP with Dr. Aponte was 8/2/2023. Hx of ED and BPH with prostatitis.  He is doing well post op.    Procedure Date:  12/03/2020  Procedure:  Male Diagnostic Cystourethroscopy  Pre-op diagnosis: BPH with obstruction  Post-op diagnosis: same  Anesthesia: Local  Surgeon:  James Aponte MD  Findings:  Urethra:  Normal urethra.   Sphincter: competent.  Prostate: Estimated Length Prostatic Urethra: 3.5 cm with moderate obstruction  Bladder neck: patent with no stricture  Bladder:  Normal bladder.   Normal ureteral orifices bilaterally.   Moderate trabeculation.    Conclusion:  1. BPH with obstruction  Responded well to flomax so far but would like to get even better.  I think that he will be a good candidate for Rezum Therapy.  Will plan it in January  Plan:  Follow up:  Rezum Therapy     Pt never followed up for Rezum Therapy.       REVIEW OF SYSTEMS:  Review of Systems   Constitutional:  Negative for chills and fever.   HENT:  Negative for congestion and sore throat.    Respiratory:  Negative for cough and shortness of breath.    Cardiovascular:  Negative for chest pain and palpitations.   Gastrointestinal:  Negative for nausea and vomiting.   Genitourinary:  Negative for flank pain and hematuria.   Neurological:  Negative for dizziness and headaches.         PATIENT HISTORY:    Past Medical History:   Diagnosis Date    Aortic aneurysm     Arrhythmia     Hypertension        Past Surgical History:   Procedure Laterality Date    ABDOMINAL SURGERY      CYSTOSCOPY N/A 8/2/2023    Procedure: CYSTOSCOPY;  Surgeon: James Aponte MD;  Location: [facility] OR [address] [place] [address];  Service: Urology;  Laterality: N/A;    TRANSURETHRAL RESECTION OF PROSTATE (TURP) USING LASER N/A 8/2/2023    Procedure: TURP, USING LASER;  Surgeon: James Aponte MD;  Location: [facility] OR [address] [place] [address];  Service: Urology;  Laterality: N/A;       History  reviewed. No pertinent family history.    Social History     Socioeconomic History    Marital status: Single   Tobacco Use    Smoking status: Every Day     Current packs/day: 0.50     Types: Cigarettes    Smokeless tobacco: Never   Substance and Sexual Activity    Alcohol use: No    Sexual activity: Not Currently       Allergies:  Iodinated contrast media and Iodine    Medications:    Current Outpatient Medications:     amLODIPine (NORVASC) 5 MG tablet, Take 1 tablet by mouth once daily., Disp: , Rfl:     atorvastatin (LIPITOR) 20 MG tablet, Take 1 tablet by mouth once daily., Disp: , Rfl:     carvedilol (COREG) 12.5 MG tablet, Take 12.5 mg by mouth 2 (two) times daily with meals., Disp: , Rfl:     hydroCHLOROthiazide (HYDRODIURIL) 12.5 MG Tab, Take 1 tablet by mouth once daily., Disp: , Rfl:     omeprazole (PRILOSEC) 20 MG capsule, Take 20 mg by mouth once daily., Disp: , Rfl:     oxybutynin (DITROPAN-XL) 10 MG 24 hr tablet, Take 1 tablet (10 mg total) by mouth once daily., Disp: 30 tablet, Rfl: 5    oxyCODONE-acetaminophen (PERCOCET) 5-325 mg per tablet, Take 1 tablet by mouth every 4 (four) hours as needed for Pain., Disp: 15 tablet, Rfl: 0    phenazopyridine (PYRIDIUM) 200 MG tablet, Take 1 tablet (200 mg total) by mouth 3 (three) times daily as needed for Pain., Disp: 21 tablet, Rfl: 0    sulfamethoxazole-trimethoprim 800-160mg (BACTRIM DS) 800-160 mg Tab, Take 1 tablet by mouth 2 (two) times daily. for 7 days, Disp: 14 tablet, Rfl: 0    tamsulosin (FLOMAX) 0.4 mg Cap, Take 1 capsule (0.4 mg total) by mouth every evening., Disp: 30 capsule, Rfl: 11    torsemide (DEMADEX) 5 MG Tab, Take 20 mg by mouth once daily., Disp: , Rfl:     valsartan (DIOVAN) 160 MG tablet, Take 1 tablet by mouth once daily., Disp: , Rfl:     sildenafiL (VIAGRA) 100 MG tablet, TAKE 1 TABLET (100 MG TOTAL) BY MOUTH DAILY AS NEEDED., Disp: 30 tablet, Rfl: 3    tadalafiL (CIALIS) 5 MG tablet, Take 1 tablet (5 mg total) by mouth daily as  needed for Erectile Dysfunction., Disp: 30 tablet, Rfl: 11    PHYSICAL EXAMINATION:  Physical Exam  Constitutional:       Appearance: Normal appearance.   HENT:      Head: Normocephalic and atraumatic.      Right Ear: External ear normal.      Left Ear: External ear normal.   Pulmonary:      Effort: Pulmonary effort is normal. No respiratory distress.   Skin:     General: Skin is warm and dry.   Neurological:      General: No focal deficit present.      Mental Status: He is alert and oriented to person, place, and time.   Psychiatric:         Mood and Affect: Mood normal.         Behavior: Behavior normal.             Lab Results   Component Value Date    PSADIAG 2.5 05/25/2023    PSADIAG 2.2 10/27/2020    PSADIAG 2.5 06/25/2019       Lab Results   Component Value Date    CREATININE 0.9 07/10/2023    EGFRNORACEVR >60.0 07/10/2023             IMPRESSION:    Encounter Diagnoses   Name Primary?    OAB (overactive bladder) Yes    Benign prostatic hyperplasia with urinary obstruction          Assessment:       1. OAB (overactive bladder)    2. Benign prostatic hyperplasia with urinary obstruction        Plan:   Voiding trial performed by Nurse Jenn.  150 ml of sterile water was instilled into bladder.  Lee catheter was removed. Patient urinated 100 ml without difficulty.     Instructed to complete Bactrim DS, continue daily 0.4 mg Flomax and hold oxybutynin x 1 week.    Voiding trial passed  Patient was instructed to drink plenty of fluids today.  Will call pt 1 o'clock to check urination status. He has urinated 6x since being home, small amounts. Denies bladder pain/pressure.  I instructed patient to return to clinic or emergency department (if after clinic hours) to have lee catheter put back in if unable to urinate within 5 hours of lee catheter removal or starts to experience bladder pressure/pain, decrease flow, straining/difficulty urinating, urinary frequency.    Patient voiced understanding.    Return to  clinic 10/3/2023 for post op apt with Dr. Fadi SHANKS spent 30 minutes with the patient of which more than half was spent in direct consultation with the patient in regards to our treatment and plan.  We addressed the office findings and recent labs.   Education and recommendations of today's plan of care including home remedies and needed follow up with PCP.

## 2025-04-01 ENCOUNTER — TELEPHONE (OUTPATIENT)
Dept: UROLOGY | Facility: CLINIC | Age: 80
End: 2025-04-01
Payer: MEDICARE

## 2025-04-01 DIAGNOSIS — Z90.79 S/P TURP: ICD-10-CM

## 2025-04-01 DIAGNOSIS — N13.8 BENIGN PROSTATIC HYPERPLASIA WITH URINARY OBSTRUCTION: Primary | ICD-10-CM

## 2025-04-01 DIAGNOSIS — N40.1 BENIGN PROSTATIC HYPERPLASIA WITH URINARY OBSTRUCTION: Primary | ICD-10-CM

## 2025-04-01 DIAGNOSIS — N39.41 URGE INCONTINENCE OF URINE: ICD-10-CM

## 2025-04-01 PROBLEM — R32 URINARY INCONTINENCE: Status: ACTIVE | Noted: 2025-04-01

## 2025-04-01 NOTE — TELEPHONE ENCOUNTER
S/p laser TURP with Dr. Aponte was 8/2/2023. Hx of ED and BPH with prostatitis.  He is doing well post op.  C/o urine leakage following laser TURP in 2023.    Date: 08/02/2023   Pre-Op Diagnosis: BPH  Post-Op Diagnosis: same   Procedure(s) Performed:   1.  Laser enucleation / vaporization of the prostate  Findings:   -Bladder neck opened to the verumontanum. Open prostatic urethra with hemostasis controlled      Lab Results   Component Value Date    PSADIAG 2.5 05/25/2023    PSADIAG 2.2 10/27/2020    PSADIAG 2.5 06/25/2019        Benign prostatic hyperplasia with urinary obstruction  -     Prostate Specific Antigen, Diagnostic; Future; Expected date: 04/01/2025    Urge incontinence of urine    S/P TURP     I asked him to come and see me on 1 pm this Thurs in clinic.  Will do PSA after his clinic visit.

## 2025-04-03 ENCOUNTER — OFFICE VISIT (OUTPATIENT)
Dept: UROLOGY | Facility: CLINIC | Age: 80
End: 2025-04-03
Payer: MEDICARE

## 2025-04-03 ENCOUNTER — LAB VISIT (OUTPATIENT)
Dept: LAB | Facility: HOSPITAL | Age: 80
End: 2025-04-03
Attending: UROLOGY
Payer: MEDICARE

## 2025-04-03 VITALS
HEART RATE: 62 BPM | HEIGHT: 67 IN | WEIGHT: 163.56 LBS | DIASTOLIC BLOOD PRESSURE: 77 MMHG | SYSTOLIC BLOOD PRESSURE: 143 MMHG | BODY MASS INDEX: 25.67 KG/M2

## 2025-04-03 DIAGNOSIS — R35.1 NOCTURIA: ICD-10-CM

## 2025-04-03 DIAGNOSIS — Z90.79 S/P TURP: Primary | ICD-10-CM

## 2025-04-03 DIAGNOSIS — N13.8 BENIGN PROSTATIC HYPERPLASIA WITH URINARY OBSTRUCTION: ICD-10-CM

## 2025-04-03 DIAGNOSIS — N40.1 BENIGN PROSTATIC HYPERPLASIA WITH URINARY OBSTRUCTION: ICD-10-CM

## 2025-04-03 DIAGNOSIS — N32.81 OAB (OVERACTIVE BLADDER): ICD-10-CM

## 2025-04-03 LAB — PSA SERPL-MCNC: 2.26 NG/ML

## 2025-04-03 PROCEDURE — 36415 COLL VENOUS BLD VENIPUNCTURE: CPT

## 2025-04-03 PROCEDURE — 99213 OFFICE O/P EST LOW 20 MIN: CPT | Mod: PBBFAC | Performed by: UROLOGY

## 2025-04-03 PROCEDURE — 99999 PR PBB SHADOW E&M-EST. PATIENT-LVL III: CPT | Mod: PBBFAC,,, | Performed by: UROLOGY

## 2025-04-03 PROCEDURE — 84153 ASSAY OF PSA TOTAL: CPT

## 2025-04-03 PROCEDURE — 99215 OFFICE O/P EST HI 40 MIN: CPT | Mod: S$PBB,,, | Performed by: UROLOGY

## 2025-04-03 RX ORDER — MIRABEGRON 50 MG/1
1 TABLET, FILM COATED, EXTENDED RELEASE ORAL DAILY
Qty: 30 TABLET | Refills: 11 | Status: SHIPPED | OUTPATIENT
Start: 2025-04-03 | End: 2026-04-03

## 2025-04-03 RX ORDER — MIRABEGRON 25 MG/1
50 TABLET, FILM COATED, EXTENDED RELEASE ORAL DAILY
Qty: 60 TABLET | Refills: 11 | Status: SHIPPED | OUTPATIENT
Start: 2025-04-03 | End: 2026-04-03

## 2025-04-03 RX ORDER — TADALAFIL 5 MG/1
5 TABLET ORAL DAILY PRN
Qty: 30 TABLET | Refills: 11 | Status: SHIPPED | OUTPATIENT
Start: 2025-04-03 | End: 2026-04-03

## 2025-04-03 NOTE — PROGRESS NOTES
CHIEF COMPLAINT:  VT    HISTORY OF PRESENTING ILLINESS:  Pee Alba is a 78 y.o. man who presents today for a post op TURP VT. TURP with Dr. Aponte was 8/2/2023. Hx of ED and BPH with prostatitis.  He is doing well post op.    Procedure Date:  12/03/2020  Procedure:  Male Diagnostic Cystourethroscopy  Pre-op diagnosis: BPH with obstruction  Post-op diagnosis: same  Anesthesia: Local  Surgeon:  James Aponte MD  Findings:  Urethra:  Normal urethra.   Sphincter: competent.  Prostate: Estimated Length Prostatic Urethra: 3.5 cm with moderate obstruction  Bladder neck: patent with no stricture  Bladder:  Normal bladder.   Normal ureteral orifices bilaterally.   Moderate trabeculation.    Conclusion:  1. BPH with obstruction  Responded well to flomax so far but would like to get even better.  I think that he will be a good candidate for Rezum Therapy.  Will plan it in January  Plan:  Follow up:  Rezum Therapy     Pt never followed up for Rezum Therapy.       REVIEW OF SYSTEMS:  Review of Systems   Constitutional:  Negative for chills and fever.   HENT:  Negative for congestion and sore throat.    Respiratory:  Negative for cough and shortness of breath.    Cardiovascular:  Negative for chest pain and palpitations.   Gastrointestinal:  Negative for nausea and vomiting.   Genitourinary:  Negative for flank pain and hematuria.   Neurological:  Negative for dizziness and headaches.         PATIENT HISTORY:    Past Medical History:   Diagnosis Date    Aortic aneurysm     Arrhythmia     Hypertension        Past Surgical History:   Procedure Laterality Date    ABDOMINAL SURGERY      CYSTOSCOPY N/A 8/2/2023    Procedure: CYSTOSCOPY;  Surgeon: James Aponte MD;  Location: [facility] OR [address] [place] [address];  Service: Urology;  Laterality: N/A;    TRANSURETHRAL RESECTION OF PROSTATE (TURP) USING LASER N/A 8/2/2023    Procedure: TURP, USING LASER;  Surgeon: James Aponte MD;  Location: [facility] OR [address] [place] [address];  Service: Urology;  Laterality: N/A;       No family  history on file.    Social History     Socioeconomic History    Marital status: Single   Tobacco Use    Smoking status: Every Day     Current packs/day: 0.50     Types: Cigarettes    Smokeless tobacco: Never   Substance and Sexual Activity    Alcohol use: No    Sexual activity: Not Currently       Allergies:  Iodinated contrast media and Iodine    Medications:    Current Outpatient Medications:     amLODIPine (NORVASC) 5 MG tablet, Take 1 tablet by mouth once daily., Disp: , Rfl:     atorvastatin (LIPITOR) 20 MG tablet, Take 1 tablet by mouth once daily., Disp: , Rfl:     carvedilol (COREG) 12.5 MG tablet, Take 12.5 mg by mouth 2 (two) times daily with meals., Disp: , Rfl:     hydroCHLOROthiazide (HYDRODIURIL) 12.5 MG Tab, Take 1 tablet by mouth once daily., Disp: , Rfl:     omeprazole (PRILOSEC) 20 MG capsule, Take 20 mg by mouth once daily., Disp: , Rfl:     torsemide (DEMADEX) 5 MG Tab, Take 20 mg by mouth once daily., Disp: , Rfl:     valsartan (DIOVAN) 160 MG tablet, Take 1 tablet by mouth once daily., Disp: , Rfl:     hyoscyamine (ANASPAZ,LEVSIN) 0.125 mg Tab, Take 1 tablet (125 mcg total) by mouth every 6 (six) hours as needed (bladder spasms, frequency, urgency)., Disp: 40 tablet, Rfl: 1    mirabegron (MYRBETRIQ) 25 mg Tb24 ER tablet, Take 2 tablets (50 mg total) by mouth once daily., Disp: 60 tablet, Rfl: 11    mirabegron (MYRBETRIQ) 50 mg Tb24, Take 1 tablet (50 mg total) by mouth once daily., Disp: 30 tablet, Rfl: 11    oxyCODONE-acetaminophen (PERCOCET) 5-325 mg per tablet, Take 1 tablet by mouth every 4 (four) hours as needed for Pain. (Patient not taking: Reported on 4/3/2025), Disp: 15 tablet, Rfl: 0    sildenafiL (VIAGRA) 100 MG tablet, TAKE 1 TABLET (100 MG TOTAL) BY MOUTH DAILY AS NEEDED., Disp: 30 tablet, Rfl: 3    tadalafiL (CIALIS) 5 MG tablet, Take 1 tablet (5 mg total) by mouth daily as needed., Disp: 30 tablet, Rfl: 11    PHYSICAL EXAMINATION:  Physical Exam  Constitutional:       Appearance:  Normal appearance.   HENT:      Head: Normocephalic and atraumatic.      Right Ear: External ear normal.      Left Ear: External ear normal.   Pulmonary:      Effort: Pulmonary effort is normal. No respiratory distress.   Skin:     General: Skin is warm and dry.   Neurological:      General: No focal deficit present.      Mental Status: He is alert and oriented to person, place, and time.   Psychiatric:         Mood and Affect: Mood normal.         Behavior: Behavior normal.             Lab Results   Component Value Date    PSADIAG 2.5 05/25/2023    PSADIAG 2.2 10/27/2020    PSADIAG 2.5 06/25/2019       Lab Results   Component Value Date    CREATININE 1.04 12/17/2024    EGFRNORACEVR 74 (L) 12/17/2024       UA clear today  PVR per bladder scan: 0 ml      IMPRESSION:    Encounter Diagnoses   Name Primary?    S/P TURP Yes    OAB (overactive bladder)     Nocturia     Benign prostatic hyperplasia with urinary obstruction          Assessment:       S/P TURP    OAB (overactive bladder)  -     mirabegron (MYRBETRIQ) 25 mg Tb24 ER tablet; Take 2 tablets (50 mg total) by mouth once daily.  Dispense: 60 tablet; Refill: 11  -     mirabegron (MYRBETRIQ) 50 mg Tb24; Take 1 tablet (50 mg total) by mouth once daily.  Dispense: 30 tablet; Refill: 11    Nocturia    Benign prostatic hyperplasia with urinary obstruction  -     tadalafiL (CIALIS) 5 MG tablet; Take 1 tablet (5 mg total) by mouth daily as needed.  Dispense: 30 tablet; Refill: 11  -     Prostate Specific Antigen, Diagnostic; Future; Expected date: 04/03/2025       Plan:   OAB symptoms especially in the morning are due to diuretics ( HCTS and Demadex)  OK to stop flomax.  Not taking oxybutynin xl any more.  Continue daily cialis.  Start mybetriq 50 mg daily ( or Gemtesa) for OAB. Check his BP and make sure that his BP is not affected by Myrbetriq.  PSA today    Follow up in about 6 months (around 10/3/2025).

## 2025-04-03 NOTE — PATIENT INSTRUCTIONS
Lab Results   Component Value Date    PSADIAG 2.5 05/25/2023    PSADIAG 2.2 10/27/2020    PSADIAG 2.5 06/25/2019

## 2025-04-04 ENCOUNTER — TELEPHONE (OUTPATIENT)
Dept: UROLOGY | Facility: CLINIC | Age: 80
End: 2025-04-04
Payer: MEDICARE

## 2025-04-04 ENCOUNTER — RESULTS FOLLOW-UP (OUTPATIENT)
Dept: UROLOGY | Facility: CLINIC | Age: 80
End: 2025-04-04

## 2025-04-04 NOTE — TELEPHONE ENCOUNTER
Lab Results   Component Value Date    PSA 2.26 04/03/2025    PSADIAG 2.5 05/25/2023    PSADIAG 2.2 10/27/2020    PSADIAG 2.5 06/25/2019

## (undated) DEVICE — SOL NACL IRR 1000ML BTL

## (undated) DEVICE — SYR 10CC LUER LOCK

## (undated) DEVICE — PACK CYSTOSCOPY III SIRUS

## (undated) DEVICE — UNDERGLOVES BIOGEL PI SIZE 7.5

## (undated) DEVICE — SOL IRRI STRL WATER 1000ML

## (undated) DEVICE — SOL NACL IRR 3000ML

## (undated) DEVICE — SYR 50ML CATH TIP

## (undated) DEVICE — BAG DRAIN ANTI REFLUX 2000ML

## (undated) DEVICE — HOLDER CATH IAB ADH STATLOCK

## (undated) DEVICE — Device

## (undated) DEVICE — TRAY CYSTO BASIN OMC

## (undated) DEVICE — UNDERGLOVES BIOGEL PI SZ 7 LF

## (undated) DEVICE — BAG LINGEMAN DRAIN UROLOGY

## (undated) DEVICE — ADAPTER HOSE 10FT 8MM